# Patient Record
Sex: MALE | Race: WHITE | NOT HISPANIC OR LATINO | Employment: FULL TIME | URBAN - METROPOLITAN AREA
[De-identification: names, ages, dates, MRNs, and addresses within clinical notes are randomized per-mention and may not be internally consistent; named-entity substitution may affect disease eponyms.]

---

## 2020-10-11 ENCOUNTER — HOSPITAL ENCOUNTER (OUTPATIENT)
Facility: CLINIC | Age: 20
Discharge: HOME | End: 2020-10-12
Attending: EMERGENCY MEDICINE | Admitting: STUDENT IN AN ORGANIZED HEALTH CARE EDUCATION/TRAINING PROGRAM
Payer: COMMERCIAL

## 2020-10-11 ENCOUNTER — ANESTHESIA EVENT (OUTPATIENT)
Dept: SURGERY | Facility: CLINIC | Age: 20
End: 2020-10-11
Payer: COMMERCIAL

## 2020-10-11 ENCOUNTER — NURSE TRIAGE (OUTPATIENT)
Dept: NURSING | Facility: CLINIC | Age: 20
End: 2020-10-11

## 2020-10-11 ENCOUNTER — APPOINTMENT (OUTPATIENT)
Dept: ULTRASOUND IMAGING | Facility: CLINIC | Age: 20
End: 2020-10-11
Attending: EMERGENCY MEDICINE
Payer: COMMERCIAL

## 2020-10-11 ENCOUNTER — ANESTHESIA (OUTPATIENT)
Dept: SURGERY | Facility: CLINIC | Age: 20
End: 2020-10-11
Payer: COMMERCIAL

## 2020-10-11 ENCOUNTER — OFFICE VISIT (OUTPATIENT)
Dept: URGENT CARE | Facility: URGENT CARE | Age: 20
End: 2020-10-11
Payer: COMMERCIAL

## 2020-10-11 VITALS
HEIGHT: 65 IN | TEMPERATURE: 97.7 F | WEIGHT: 140 LBS | DIASTOLIC BLOOD PRESSURE: 68 MMHG | HEART RATE: 77 BPM | BODY MASS INDEX: 23.32 KG/M2 | SYSTOLIC BLOOD PRESSURE: 114 MMHG

## 2020-10-11 DIAGNOSIS — N44.00 TESTICULAR TORSION: ICD-10-CM

## 2020-10-11 DIAGNOSIS — N50.811 RIGHT TESTICULAR PAIN: ICD-10-CM

## 2020-10-11 DIAGNOSIS — R10.31 RLQ ABDOMINAL PAIN: Primary | ICD-10-CM

## 2020-10-11 LAB
ALBUMIN UR-MCNC: NEGATIVE MG/DL
ANION GAP SERPL CALCULATED.3IONS-SCNC: 8 MMOL/L (ref 3–14)
APPEARANCE UR: CLEAR
BACTERIA #/AREA URNS HPF: ABNORMAL /HPF
BASOPHILS # BLD AUTO: 0 10E9/L (ref 0–0.2)
BASOPHILS NFR BLD AUTO: 0.2 %
BILIRUB UR QL STRIP: ABNORMAL
BUN SERPL-MCNC: 14 MG/DL (ref 7–30)
CALCIUM SERPL-MCNC: 9.7 MG/DL (ref 8.5–10.1)
CHLORIDE SERPL-SCNC: 99 MMOL/L (ref 94–109)
CO2 SERPL-SCNC: 26 MMOL/L (ref 20–32)
COLOR UR AUTO: YELLOW
CREAT SERPL-MCNC: 0.87 MG/DL (ref 0.66–1.25)
DIFFERENTIAL METHOD BLD: ABNORMAL
EOSINOPHIL # BLD AUTO: 0 10E9/L (ref 0–0.7)
EOSINOPHIL NFR BLD AUTO: 0 %
ERYTHROCYTE [DISTWIDTH] IN BLOOD BY AUTOMATED COUNT: 12.9 % (ref 10–15)
GFR SERPL CREATININE-BSD FRML MDRD: >90 ML/MIN/{1.73_M2}
GLUCOSE SERPL-MCNC: 105 MG/DL (ref 70–99)
GLUCOSE UR STRIP-MCNC: NEGATIVE MG/DL
HCT VFR BLD AUTO: 39.8 % (ref 40–53)
HGB BLD-MCNC: 13 G/DL (ref 13.3–17.7)
HGB UR QL STRIP: NEGATIVE
HYALINE CASTS #/AREA URNS LPF: ABNORMAL /LPF
IMM GRANULOCYTES # BLD: 0.1 10E9/L (ref 0–0.4)
IMM GRANULOCYTES NFR BLD: 0.4 %
KETONES UR STRIP-MCNC: 40 MG/DL
LEUKOCYTE ESTERASE UR QL STRIP: NEGATIVE
LYMPHOCYTES # BLD AUTO: 1 10E9/L (ref 0.8–5.3)
LYMPHOCYTES NFR BLD AUTO: 5.9 %
MCH RBC QN AUTO: 29.2 PG (ref 26.5–33)
MCHC RBC AUTO-ENTMCNC: 32.7 G/DL (ref 31.5–36.5)
MCV RBC AUTO: 89 FL (ref 78–100)
MONOCYTES # BLD AUTO: 0.6 10E9/L (ref 0–1.3)
MONOCYTES NFR BLD AUTO: 3.5 %
MUCOUS THREADS #/AREA URNS LPF: PRESENT /LPF
NEUTROPHILS # BLD AUTO: 15 10E9/L (ref 1.6–8.3)
NEUTROPHILS NFR BLD AUTO: 90 %
NITRATE UR QL: NEGATIVE
NRBC # BLD AUTO: 0 10*3/UL
NRBC BLD AUTO-RTO: 0 /100
PH UR STRIP: 5.5 PH (ref 5–7)
PLATELET # BLD AUTO: 209 10E9/L (ref 150–450)
POTASSIUM SERPL-SCNC: 4.3 MMOL/L (ref 3.4–5.3)
RBC # BLD AUTO: 4.45 10E12/L (ref 4.4–5.9)
RBC #/AREA URNS AUTO: ABNORMAL /HPF
SODIUM SERPL-SCNC: 133 MMOL/L (ref 133–144)
SOURCE: ABNORMAL
SP GR UR STRIP: >1.03 (ref 1–1.03)
UROBILINOGEN UR STRIP-ACNC: 0.2 EU/DL (ref 0.2–1)
WBC # BLD AUTO: 16.7 10E9/L (ref 4–11)
WBC #/AREA URNS AUTO: ABNORMAL /HPF

## 2020-10-11 PROCEDURE — 250N000011 HC RX IP 250 OP 636: Performed by: EMERGENCY MEDICINE

## 2020-10-11 PROCEDURE — 370N000001 HC ANESTHESIA TECHNICAL FEE, 1ST 30 MIN: Performed by: STUDENT IN AN ORGANIZED HEALTH CARE EDUCATION/TRAINING PROGRAM

## 2020-10-11 PROCEDURE — 370N000002 HC ANESTHESIA TECHNICAL FEE, EACH ADDTL 15 MIN: Performed by: STUDENT IN AN ORGANIZED HEALTH CARE EDUCATION/TRAINING PROGRAM

## 2020-10-11 PROCEDURE — 761N000007 HC RECOVERY PHASE 2 EACH 15 MINS: Performed by: STUDENT IN AN ORGANIZED HEALTH CARE EDUCATION/TRAINING PROGRAM

## 2020-10-11 PROCEDURE — 81001 URINALYSIS AUTO W/SCOPE: CPT | Performed by: PHYSICIAN ASSISTANT

## 2020-10-11 PROCEDURE — 87491 CHLMYD TRACH DNA AMP PROBE: CPT | Performed by: PHYSICIAN ASSISTANT

## 2020-10-11 PROCEDURE — 360N000015 HC SURGERY LEVEL 2 EA 15 ADDTL MIN: Performed by: STUDENT IN AN ORGANIZED HEALTH CARE EDUCATION/TRAINING PROGRAM

## 2020-10-11 PROCEDURE — 54600 REDUCE TESTIS TORSION: CPT | Mod: RT | Performed by: STUDENT IN AN ORGANIZED HEALTH CARE EDUCATION/TRAINING PROGRAM

## 2020-10-11 PROCEDURE — U0003 INFECTIOUS AGENT DETECTION BY NUCLEIC ACID (DNA OR RNA); SEVERE ACUTE RESPIRATORY SYNDROME CORONAVIRUS 2 (SARS-COV-2) (CORONAVIRUS DISEASE [COVID-19]), AMPLIFIED PROBE TECHNIQUE, MAKING USE OF HIGH THROUGHPUT TECHNOLOGIES AS DESCRIBED BY CMS-2020-01-R: HCPCS | Performed by: EMERGENCY MEDICINE

## 2020-10-11 PROCEDURE — 250N000011 HC RX IP 250 OP 636: Performed by: ANESTHESIOLOGY

## 2020-10-11 PROCEDURE — 250N000011 HC RX IP 250 OP 636: Performed by: STUDENT IN AN ORGANIZED HEALTH CARE EDUCATION/TRAINING PROGRAM

## 2020-10-11 PROCEDURE — 85025 COMPLETE CBC W/AUTO DIFF WBC: CPT | Performed by: EMERGENCY MEDICINE

## 2020-10-11 PROCEDURE — 258N000003 HC RX IP 258 OP 636: Performed by: NURSE ANESTHETIST, CERTIFIED REGISTERED

## 2020-10-11 PROCEDURE — 250N000013 HC RX MED GY IP 250 OP 250 PS 637: Performed by: EMERGENCY MEDICINE

## 2020-10-11 PROCEDURE — 272N000001 HC OR GENERAL SUPPLY STERILE: Performed by: STUDENT IN AN ORGANIZED HEALTH CARE EDUCATION/TRAINING PROGRAM

## 2020-10-11 PROCEDURE — 250N000011 HC RX IP 250 OP 636: Performed by: NURSE ANESTHETIST, CERTIFIED REGISTERED

## 2020-10-11 PROCEDURE — 761N000001 HC RECOVERY PHASE 1 LEVEL 1 FIRST HR: Performed by: STUDENT IN AN ORGANIZED HEALTH CARE EDUCATION/TRAINING PROGRAM

## 2020-10-11 PROCEDURE — 87591 N.GONORRHOEAE DNA AMP PROB: CPT | Performed by: PHYSICIAN ASSISTANT

## 2020-10-11 PROCEDURE — 99203 OFFICE O/P NEW LOW 30 MIN: CPT | Performed by: PHYSICIAN ASSISTANT

## 2020-10-11 PROCEDURE — 36415 COLL VENOUS BLD VENIPUNCTURE: CPT | Performed by: PHYSICIAN ASSISTANT

## 2020-10-11 PROCEDURE — 54640 ORCHIOPEXY INGUN/SCROT APPR: CPT | Mod: 50 | Performed by: STUDENT IN AN ORGANIZED HEALTH CARE EDUCATION/TRAINING PROGRAM

## 2020-10-11 PROCEDURE — 761N000002 HC RECOVERY PHASE 1 LEVEL 1 EA ADDTL HR: Performed by: STUDENT IN AN ORGANIZED HEALTH CARE EDUCATION/TRAINING PROGRAM

## 2020-10-11 PROCEDURE — 250N000009 HC RX 250: Performed by: NURSE ANESTHETIST, CERTIFIED REGISTERED

## 2020-10-11 PROCEDURE — 99285 EMERGENCY DEPT VISIT HI MDM: CPT | Mod: 25

## 2020-10-11 PROCEDURE — 80048 BASIC METABOLIC PNL TOTAL CA: CPT | Performed by: EMERGENCY MEDICINE

## 2020-10-11 PROCEDURE — 76870 US EXAM SCROTUM: CPT

## 2020-10-11 PROCEDURE — 360N000014 HC SURGERY LEVEL 2 1ST 30 MIN: Performed by: STUDENT IN AN ORGANIZED HEALTH CARE EDUCATION/TRAINING PROGRAM

## 2020-10-11 PROCEDURE — C9803 HOPD COVID-19 SPEC COLLECT: HCPCS

## 2020-10-11 PROCEDURE — 96374 THER/PROPH/DIAG INJ IV PUSH: CPT

## 2020-10-11 PROCEDURE — 99203 OFFICE O/P NEW LOW 30 MIN: CPT | Mod: 57 | Performed by: STUDENT IN AN ORGANIZED HEALTH CARE EDUCATION/TRAINING PROGRAM

## 2020-10-11 PROCEDURE — 999N000135 HC STATISTIC PRE PROC ASSESS I: Performed by: STUDENT IN AN ORGANIZED HEALTH CARE EDUCATION/TRAINING PROGRAM

## 2020-10-11 RX ORDER — PROPOFOL 10 MG/ML
INJECTION, EMULSION INTRAVENOUS
Status: COMPLETED | OUTPATIENT
Start: 2020-10-11 | End: 2020-10-11

## 2020-10-11 RX ORDER — HYDROMORPHONE HYDROCHLORIDE 1 MG/ML
.3-.5 INJECTION, SOLUTION INTRAMUSCULAR; INTRAVENOUS; SUBCUTANEOUS EVERY 10 MIN PRN
Status: DISCONTINUED | OUTPATIENT
Start: 2020-10-11 | End: 2020-10-12 | Stop reason: HOSPADM

## 2020-10-11 RX ORDER — METOPROLOL TARTRATE 1 MG/ML
1-2 INJECTION, SOLUTION INTRAVENOUS EVERY 5 MIN PRN
Status: DISCONTINUED | OUTPATIENT
Start: 2020-10-11 | End: 2020-10-11 | Stop reason: HOSPADM

## 2020-10-11 RX ORDER — ONDANSETRON 2 MG/ML
4 INJECTION INTRAMUSCULAR; INTRAVENOUS EVERY 30 MIN PRN
Status: COMPLETED | OUTPATIENT
Start: 2020-10-11 | End: 2020-10-12

## 2020-10-11 RX ORDER — SODIUM CHLORIDE, SODIUM LACTATE, POTASSIUM CHLORIDE, CALCIUM CHLORIDE 600; 310; 30; 20 MG/100ML; MG/100ML; MG/100ML; MG/100ML
INJECTION, SOLUTION INTRAVENOUS CONTINUOUS PRN
Status: DISCONTINUED | OUTPATIENT
Start: 2020-10-11 | End: 2020-10-12

## 2020-10-11 RX ORDER — KETOROLAC TROMETHAMINE 30 MG/ML
30 INJECTION, SOLUTION INTRAMUSCULAR; INTRAVENOUS EVERY 6 HOURS PRN
Status: DISCONTINUED | OUTPATIENT
Start: 2020-10-11 | End: 2020-10-12 | Stop reason: HOSPADM

## 2020-10-11 RX ORDER — FENTANYL CITRATE 50 UG/ML
25-50 INJECTION, SOLUTION INTRAMUSCULAR; INTRAVENOUS EVERY 5 MIN PRN
Status: DISCONTINUED | OUTPATIENT
Start: 2020-10-11 | End: 2020-10-11 | Stop reason: HOSPADM

## 2020-10-11 RX ORDER — ALBUTEROL SULFATE 0.83 MG/ML
2.5 SOLUTION RESPIRATORY (INHALATION) EVERY 4 HOURS PRN
Status: DISCONTINUED | OUTPATIENT
Start: 2020-10-11 | End: 2020-10-11 | Stop reason: HOSPADM

## 2020-10-11 RX ORDER — CEFAZOLIN SODIUM 1 G/50ML
1 INJECTION, SOLUTION INTRAVENOUS SEE ADMIN INSTRUCTIONS
Status: DISCONTINUED | OUTPATIENT
Start: 2020-10-11 | End: 2020-10-12 | Stop reason: HOSPADM

## 2020-10-11 RX ORDER — FENTANYL CITRATE 50 UG/ML
INJECTION, SOLUTION INTRAMUSCULAR; INTRAVENOUS PRN
Status: DISCONTINUED | OUTPATIENT
Start: 2020-10-11 | End: 2020-10-12

## 2020-10-11 RX ORDER — MEPERIDINE HYDROCHLORIDE 25 MG/ML
12.5 INJECTION INTRAMUSCULAR; INTRAVENOUS; SUBCUTANEOUS
Status: DISCONTINUED | OUTPATIENT
Start: 2020-10-11 | End: 2020-10-12 | Stop reason: HOSPADM

## 2020-10-11 RX ORDER — DEXAMETHASONE SODIUM PHOSPHATE 4 MG/ML
INJECTION, SOLUTION INTRA-ARTICULAR; INTRALESIONAL; INTRAMUSCULAR; INTRAVENOUS; SOFT TISSUE PRN
Status: DISCONTINUED | OUTPATIENT
Start: 2020-10-11 | End: 2020-10-12

## 2020-10-11 RX ORDER — CEFAZOLIN SODIUM 2 G/100ML
2 INJECTION, SOLUTION INTRAVENOUS
Status: COMPLETED | OUTPATIENT
Start: 2020-10-11 | End: 2020-10-11

## 2020-10-11 RX ORDER — HYDRALAZINE HYDROCHLORIDE 20 MG/ML
2.5-5 INJECTION INTRAMUSCULAR; INTRAVENOUS EVERY 10 MIN PRN
Status: DISCONTINUED | OUTPATIENT
Start: 2020-10-11 | End: 2020-10-11 | Stop reason: HOSPADM

## 2020-10-11 RX ORDER — LIDOCAINE HYDROCHLORIDE 10 MG/ML
INJECTION, SOLUTION INFILTRATION; PERINEURAL PRN
Status: DISCONTINUED | OUTPATIENT
Start: 2020-10-11 | End: 2020-10-12

## 2020-10-11 RX ORDER — NALOXONE HYDROCHLORIDE 0.4 MG/ML
.1-.4 INJECTION, SOLUTION INTRAMUSCULAR; INTRAVENOUS; SUBCUTANEOUS
Status: DISCONTINUED | OUTPATIENT
Start: 2020-10-11 | End: 2020-10-12 | Stop reason: HOSPADM

## 2020-10-11 RX ORDER — ONDANSETRON 4 MG/1
4 TABLET, ORALLY DISINTEGRATING ORAL EVERY 30 MIN PRN
Status: COMPLETED | OUTPATIENT
Start: 2020-10-11 | End: 2020-10-12

## 2020-10-11 RX ORDER — MORPHINE SULFATE 4 MG/ML
4 INJECTION, SOLUTION INTRAMUSCULAR; INTRAVENOUS
Status: DISCONTINUED | OUTPATIENT
Start: 2020-10-11 | End: 2020-10-12 | Stop reason: HOSPADM

## 2020-10-11 RX ORDER — SODIUM CHLORIDE, SODIUM LACTATE, POTASSIUM CHLORIDE, CALCIUM CHLORIDE 600; 310; 30; 20 MG/100ML; MG/100ML; MG/100ML; MG/100ML
INJECTION, SOLUTION INTRAVENOUS CONTINUOUS
Status: DISCONTINUED | OUTPATIENT
Start: 2020-10-11 | End: 2020-10-12 | Stop reason: HOSPADM

## 2020-10-11 RX ORDER — ONDANSETRON 2 MG/ML
INJECTION INTRAMUSCULAR; INTRAVENOUS
Status: DISCONTINUED
Start: 2020-10-11 | End: 2020-10-11 | Stop reason: WASHOUT

## 2020-10-11 RX ORDER — OXYCODONE AND ACETAMINOPHEN 5; 325 MG/1; MG/1
1 TABLET ORAL ONCE
Status: DISCONTINUED | OUTPATIENT
Start: 2020-10-11 | End: 2020-10-11 | Stop reason: CLARIF

## 2020-10-11 RX ORDER — PROPOFOL 10 MG/ML
INJECTION, EMULSION INTRAVENOUS PRN
Status: DISCONTINUED | OUTPATIENT
Start: 2020-10-11 | End: 2020-10-12

## 2020-10-11 RX ORDER — ACETAMINOPHEN 500 MG
1000 TABLET ORAL EVERY 4 HOURS PRN
Status: DISCONTINUED | OUTPATIENT
Start: 2020-10-11 | End: 2020-10-12 | Stop reason: HOSPADM

## 2020-10-11 RX ADMIN — MIDAZOLAM 2 MG: 1 INJECTION INTRAMUSCULAR; INTRAVENOUS at 23:38

## 2020-10-11 RX ADMIN — PROPOFOL 200 MG: 10 INJECTION, EMULSION INTRAVENOUS at 23:45

## 2020-10-11 RX ADMIN — DEXAMETHASONE SODIUM PHOSPHATE 4 MG: 4 INJECTION, SOLUTION INTRA-ARTICULAR; INTRALESIONAL; INTRAMUSCULAR; INTRAVENOUS; SOFT TISSUE at 23:45

## 2020-10-11 RX ADMIN — CEFAZOLIN SODIUM 2 G: 2 INJECTION, SOLUTION INTRAVENOUS at 23:38

## 2020-10-11 RX ADMIN — MORPHINE SULFATE 4 MG: 4 INJECTION INTRAVENOUS at 21:35

## 2020-10-11 RX ADMIN — SODIUM CHLORIDE, POTASSIUM CHLORIDE, SODIUM LACTATE AND CALCIUM CHLORIDE: 600; 310; 30; 20 INJECTION, SOLUTION INTRAVENOUS at 23:38

## 2020-10-11 RX ADMIN — Medication 100 MG: at 23:46

## 2020-10-11 RX ADMIN — FENTANYL CITRATE 100 MCG: 50 INJECTION, SOLUTION INTRAMUSCULAR; INTRAVENOUS at 23:45

## 2020-10-11 RX ADMIN — ACETAMINOPHEN 1000 MG: 500 TABLET, FILM COATED ORAL at 21:07

## 2020-10-11 RX ADMIN — Medication 100 MG: at 23:45

## 2020-10-11 RX ADMIN — PROPOFOL 200 MG: 10 INJECTION, EMULSION INTRAVENOUS at 23:46

## 2020-10-11 RX ADMIN — LIDOCAINE HYDROCHLORIDE 50 MG: 10 INJECTION, SOLUTION INFILTRATION; PERINEURAL at 23:45

## 2020-10-11 ASSESSMENT — ENCOUNTER SYMPTOMS
DYSURIA: 0
ABDOMINAL PAIN: 1
HEMATURIA: 0

## 2020-10-11 ASSESSMENT — MIFFLIN-ST. JEOR
SCORE: 1561.92
SCORE: 1561.92

## 2020-10-11 NOTE — TELEPHONE ENCOUNTER
"Patient reports \"Bad pain in lower right abdomen and right testicle\"    Has had the right testicle pain for 3 days- rates pain in testicle 7/10 and constant. Has swelling on right testicle, and it is \"a little red\" Denies fevers.    Patient states his abdominal pain is worse then the testicular pain.     Disposition: Advised to be seen in the emergency department.     Patient verbalized understanding and agrees and he states he will go into the ED.     Aiyana Reddy RN/BECKY [facility] Nurse Advisors      COVID 19 Nurse Triage Plan/Patient Instructions    Please be aware that novel coronavirus (COVID-19) may be circulating in the community. If you develop symptoms such as fever, cough, or SOB or if you have concerns about the presence of another infection including coronavirus (COVID-19), please contact your health care provider or visit [email or web address].     Disposition/Instructions    ED Visit recommended. Follow protocol based instructions.     Bring Your Own Device:  Please also bring your smart device(s) (smart phones, tablets, laptops) and their charging cables for your personal use and to communicate with your care team during your visit.    Thank you for taking steps to prevent the spread of this virus.  o Limit your contact with others.  o Wear a simple mask to cover your cough.  o Wash your hands well and often.    Resources    [facility]: About COVID-19: [email or web address]    CDC: What to Do If You're Sick: www.cdc.gov/coronavirus/2019-ncov/about/steps-when-sick.html    CDC: Ending Home Isolation: www.cdc.gov/coronavirus/2019-ncov/hcp/disposition-in-home-patients.html     CDC: Caring for Someone: www.cdc.gov/coronavirus/2019-ncov/if-you-are-sick/care-for-someone.html     [facility]: Interim Guidance for Hospital Discharge to Home: www.health.[facility].[place]/diseases/coronavirus/hcp/hospdischarge.pdf    [facility] clinical trials (COVID-19 research studies): " clinicalaffairs.[facility]-clinical-trials     Below are the COVID-19 hotlines at the [place] Department of Health ([facility]). Interpreters are available.   o For health questions: Call 826-339-3037 or 1-552.893.5595 (7 a.m. to 7 p.m.)  o For questions about schools and childcare: Call 573-669-2773 or 1-457.977.7447 (7 a.m. to 7 p.m.)     Reason for Disposition    SEVERE pain (e.g., excruciating)    Swollen scrotum    Protocols used: SCROTAL PAIN-A-AH

## 2020-10-12 VITALS
SYSTOLIC BLOOD PRESSURE: 118 MMHG | RESPIRATION RATE: 16 BRPM | WEIGHT: 140 LBS | HEIGHT: 65 IN | TEMPERATURE: 97.4 F | OXYGEN SATURATION: 99 % | HEART RATE: 84 BPM | DIASTOLIC BLOOD PRESSURE: 66 MMHG | BODY MASS INDEX: 23.32 KG/M2

## 2020-10-12 LAB
LABORATORY COMMENT REPORT: NORMAL
SARS-COV-2 RNA SPEC QL NAA+PROBE: NEGATIVE
SARS-COV-2 RNA SPEC QL NAA+PROBE: NORMAL
SPECIMEN SOURCE: NORMAL
SPECIMEN SOURCE: NORMAL

## 2020-10-12 PROCEDURE — 250N000013 HC RX MED GY IP 250 OP 250 PS 637: Performed by: STUDENT IN AN ORGANIZED HEALTH CARE EDUCATION/TRAINING PROGRAM

## 2020-10-12 PROCEDURE — 250N000011 HC RX IP 250 OP 636: Performed by: ANESTHESIOLOGY

## 2020-10-12 PROCEDURE — 250N000009 HC RX 250: Performed by: STUDENT IN AN ORGANIZED HEALTH CARE EDUCATION/TRAINING PROGRAM

## 2020-10-12 PROCEDURE — 258N000003 HC RX IP 258 OP 636: Performed by: ANESTHESIOLOGY

## 2020-10-12 PROCEDURE — 250N000011 HC RX IP 250 OP 636: Performed by: NURSE ANESTHETIST, CERTIFIED REGISTERED

## 2020-10-12 RX ORDER — ACETAMINOPHEN 500 MG
1000 TABLET ORAL EVERY 6 HOURS PRN
Qty: 100 TABLET | Refills: 0 | Status: SHIPPED | OUTPATIENT
Start: 2020-10-12 | End: 2021-05-22

## 2020-10-12 RX ORDER — OXYCODONE HYDROCHLORIDE 5 MG/1
5 TABLET ORAL EVERY 4 HOURS PRN
Status: DISCONTINUED | OUTPATIENT
Start: 2020-10-12 | End: 2020-10-12 | Stop reason: HOSPADM

## 2020-10-12 RX ORDER — ACETAMINOPHEN 500 MG
1000 TABLET ORAL ONCE
Status: COMPLETED | OUTPATIENT
Start: 2020-10-12 | End: 2020-10-12

## 2020-10-12 RX ORDER — FENTANYL CITRATE 50 UG/ML
25-50 INJECTION, SOLUTION INTRAMUSCULAR; INTRAVENOUS
Status: DISCONTINUED | OUTPATIENT
Start: 2020-10-12 | End: 2020-10-12 | Stop reason: HOSPADM

## 2020-10-12 RX ORDER — OXYCODONE HYDROCHLORIDE 5 MG/1
5 TABLET ORAL EVERY 6 HOURS PRN
Qty: 15 TABLET | Refills: 0 | Status: SHIPPED | OUTPATIENT
Start: 2020-10-12 | End: 2020-10-17

## 2020-10-12 RX ORDER — BACITRACIN ZINC 500 [USP'U]/G
OINTMENT TOPICAL PRN
Status: DISCONTINUED | OUTPATIENT
Start: 2020-10-12 | End: 2020-10-12 | Stop reason: HOSPADM

## 2020-10-12 RX ORDER — BACITRACIN ZINC 500 [USP'U]/G
OINTMENT TOPICAL 2 TIMES DAILY
Qty: 30 G | Refills: 1 | Status: SHIPPED | OUTPATIENT
Start: 2020-10-12 | End: 2021-05-22

## 2020-10-12 RX ORDER — OXYCODONE HYDROCHLORIDE 5 MG/1
5 TABLET ORAL ONCE
Status: CANCELLED | OUTPATIENT
Start: 2020-10-12 | End: 2020-10-12

## 2020-10-12 RX ORDER — DOCUSATE SODIUM 100 MG/1
100 CAPSULE, LIQUID FILLED ORAL 2 TIMES DAILY
Qty: 30 CAPSULE | Refills: 0 | Status: SHIPPED | OUTPATIENT
Start: 2020-10-12 | End: 2021-05-22

## 2020-10-12 RX ADMIN — HYDROMORPHONE HYDROCHLORIDE 0.5 MG: 1 INJECTION, SOLUTION INTRAMUSCULAR; INTRAVENOUS; SUBCUTANEOUS at 01:42

## 2020-10-12 RX ADMIN — SODIUM CHLORIDE, POTASSIUM CHLORIDE, SODIUM LACTATE AND CALCIUM CHLORIDE: 600; 310; 30; 20 INJECTION, SOLUTION INTRAVENOUS at 01:48

## 2020-10-12 RX ADMIN — PROPOFOL 30 MG: 10 INJECTION, EMULSION INTRAVENOUS at 00:56

## 2020-10-12 RX ADMIN — ACETAMINOPHEN 1000 MG: 500 TABLET, FILM COATED ORAL at 03:25

## 2020-10-12 RX ADMIN — HYDROMORPHONE HYDROCHLORIDE 0.5 MG: 1 INJECTION, SOLUTION INTRAMUSCULAR; INTRAVENOUS; SUBCUTANEOUS at 00:52

## 2020-10-12 RX ADMIN — HYDROMORPHONE HYDROCHLORIDE 0.5 MG: 1 INJECTION, SOLUTION INTRAMUSCULAR; INTRAVENOUS; SUBCUTANEOUS at 00:57

## 2020-10-12 RX ADMIN — ONDANSETRON 4 MG: 2 INJECTION INTRAMUSCULAR; INTRAVENOUS at 02:31

## 2020-10-12 RX ADMIN — ONDANSETRON 4 MG: 2 INJECTION INTRAMUSCULAR; INTRAVENOUS at 00:26

## 2020-10-12 RX ADMIN — PROCHLORPERAZINE EDISYLATE 10 MG: 5 INJECTION INTRAMUSCULAR; INTRAVENOUS at 03:40

## 2020-10-12 RX ADMIN — HYDROMORPHONE HYDROCHLORIDE 0.5 MG: 1 INJECTION, SOLUTION INTRAMUSCULAR; INTRAVENOUS; SUBCUTANEOUS at 01:59

## 2020-10-12 NOTE — ED PROVIDER NOTES
"History     Chief Complaint:  Testicular Pain       HPI  Rachid Umanzor is a 20 year old year old male who presents for evaluation of testicular pain. Two days ago the patient began experiencing mild right testicular pain and then today the pain worsened about 4 hours prior to arrival and was accompanied by right lower quadrant abdominal pain. He rates his current pain an \"8 or 9\" out of 10. He did take ibuprofen for pain yesterday but has not taken anything today. He denies any trauma or injury to the area, history of kidney problems, or concern for STD. He also denies hematuria, dysuria, or discharge or drainage.    Urine from Urgent Care earlier on 10/11/20  Result Value      Color Urine Yellow      Appearance Urine Clear      Glucose Urine Negative      Bilirubin Urine Small (A)      Ketones Urine 40 (A)      Specific Gravity Urine >1.030      pH Urine 5.5      Protein Albumin Urine Negative      Urobilinogen Urine 0.2      Nitrite Urine Negative      Blood Urine Negative      Leukocyte Esterase Urine Negative      Source Midstream Urine      WBC Urine 0 - 5      RBC Urine O - 2      Hyaline Casts 2-5 (A)      Bacteria Urine Few (A)      Mucous Urine Present (A)    Chlamydia trachomatic PCR and Neisseria Gonorrhea PCR: pending    Allergies:  Azithromycin       Medications:   Medications reviewed. No pertinent medications.      Medical History:   Arachnoid cyst      Surgical History:   Surgical history reviewed. No pertinent surgical history.      Family History:   Family history reviewed. No pertinent family history.      Social History:  Smoking Status: Negative   Smokeless Tobacco: Negative   Alcohol Use: Negative   Drug Use: Negative   Primary Physician: Saadia Mckee         Review of Systems   Gastrointestinal: Positive for abdominal pain.   Genitourinary: Positive for scrotal swelling and testicular pain. Negative for dysuria and hematuria.   All other systems reviewed and are negative.        Physical Exam "     Patient Vitals for the past 24 hrs:   BP Temp Temp src Pulse Resp SpO2   10/11/20 2030 114/73 98.7  F (37.1  C) Oral 86 18 99 %          Physical Exam  Constitutional:       Appearance: He is well-developed.   HENT:      Right Ear: External ear normal.      Left Ear: External ear normal.      Mouth/Throat:      Mouth: Mucous membranes are moist.      Pharynx: Oropharynx is clear. No oropharyngeal exudate.   Eyes:      General: No scleral icterus.     Conjunctiva/sclera: Conjunctivae normal.      Pupils: Pupils are equal, round, and reactive to light.   Cardiovascular:      Rate and Rhythm: Normal rate and regular rhythm.      Heart sounds: Normal heart sounds. No murmur. No friction rub. No gallop.    Pulmonary:      Effort: Pulmonary effort is normal. No respiratory distress.      Breath sounds: Normal breath sounds. No wheezing or rales.   Abdominal:      General: Bowel sounds are normal. There is no distension.      Palpations: Abdomen is soft. There is no mass.      Tenderness: There is abdominal tenderness. There is no guarding or rebound.      Comments: Mild RLQ TTP   Genitourinary:     Penis: Normal.       Testes:         Right: Tenderness and swelling present.      Epididymis:      Right: Normal.      Left: Normal.   Musculoskeletal: Normal range of motion.   Skin:     General: Skin is warm and dry.      Capillary Refill: Capillary refill takes less than 2 seconds.      Findings: No rash.   Neurological:      Mental Status: He is alert.           Emergency Department Course     Imaging:  Radiology results were communicated with the patient who voiced understanding of the findings.    US Testicular & Scrotum w Doppler Ltd  1.  No blood flow in the right testicle consistent with testicular torsion.    Findings telephoned to Dr. Rayo on 10/11/2020 at 10:10 PM.    Reading per radiology     Laboratory:  Laboratory findings were communicated with the patient who voiced understanding of the  findings.    COVID-19 Virus (Coronavirus), PCR NP Swab: pending      CBC: WBC 16.7 (H), HGB 13.0 (L),   BMP: Glucose 105 (H) (Creatinine 0.87) o/w WNL       Interventions:   2107 Tylenol 1000 mg PO  2135 Morphine 4 mg IV      Emergency Department Course:    2035 Nursing notes and vitals reviewed.    2040 I performed an exam of the patient as documented above.     2112 IV was inserted and blood was drawn for laboratory testing, results above.    2149 The patient was sent for US while in the emergency department, results above.     2225 I consulted with Dr. Shaw of the urology services. They are in agreement to accept the patient for admission. Findings and plan explained to the Patient who consents to admission. Discussed the patient with Dr. Shaw, who will admit the patient to an OR bed.    2229 A nares sample was obtained for laboratory testing as documented above.      Impression & Plan     Medical Decision Making:    This patient presents with almost two days of right testicular pain and swelling. Patient on exam does have obvious right testicular swelling. It is tender towards the interior portion and there is some mild right lower quadrant tenderness. He appears comfortable although his white count is elevated. He did have a urine sample sent at urgent care and that did not show anything concerning. Ultrasound confirmed testicular torsion without any blood flow. I contacted urology Dr. Shaw immediately and he agreed to surgically evaluate the patient. Patient's last PO status was 11 am with meal. He is comfortable with the treatment plan. Mother is in agreement. He was made NPO.     Covid-19  Rachid Umanzor was evaluated during a global COVID-19 pandemic, which necessitated consideration that the patient might be at risk for infection with the SARS-CoV-2 virus that causes COVID-19.   Applicable protocols for evaluation were followed during the patient's care.   COVID-19 was considered as part of the  patient's evaluation. The plan for testing is:  a test was obtained during this visit.    Diagnosis:     ICD-10-CM    1. Testicular torsion  N44.00         Disposition:  Admitted to Dr. Shaw in the OR.      Scribe Disclosure:  I, Katelyn Fagan, am serving as a scribe at 8:37 PM on 10/11/2020 to document services personally performed by Srinivas Rayo MD based on my observations and the provider's statements to me.      Srinivas Rayo MD  10/11/20 [id number]

## 2020-10-12 NOTE — ED TRIAGE NOTES
Pt presents to ED due to right testicular pain/swelling.  Pain radiating into flank.  Was sent from  for ultrasound.  ABCs intact

## 2020-10-12 NOTE — ANESTHESIA POSTPROCEDURE EVALUATION
Patient: Rachid Umanzor    Procedure(s):  SCROTAL EXPLORATION, BILATERAL ORCHIOPEXY    Diagnosis:Torsion of right testicle [N44.00]  Diagnosis Additional Information: No value filed.    Anesthesia Type:  General    Note:  Anesthesia Post Evaluation    Patient location during evaluation: PACU  Patient participation: Able to fully participate in evaluation  Level of consciousness: awake  Pain management: adequate  Airway patency: patent  Cardiovascular status: acceptable  Respiratory status: acceptable  Hydration status: acceptable  PONV: controlled     Anesthetic complications: None          Last vitals:  Vitals:    10/12/20 0325 10/12/20 0420 10/12/20 0450   BP: 118/74 102/55 118/66   Pulse:  105 84   Resp: 16 10 16   Temp:  97.2  F (36.2  C) 97.4  F (36.3  C)   SpO2: 97% 99%          Electronically Signed By: Mike Bray MD  October 12, 2020  5:30 AM

## 2020-10-12 NOTE — PROGRESS NOTES
"Patient presents with:  Urgent Care: RLQ abdominal pain that started today.   Urgent Care: groin pain that started a few days ago.       SUBJECTIVE  HPI:  Rachid Umanzor is a 20 year old male who presents with right testicular pain for 3 days with right sided abdominal pain today.     Testicle is swollen and tender.    Denies any dysuria or penile discharge.  Denies any urinary frequency or urgency    He is currently sexually active.    Denies any fevers.  Denies any vomiting, but did have some nausea with the abdominal pain.   Abdominal and testicle pain are 8/10 on a 0-10 pain scale.        SH: here with his mother today    Past Medical History:   Diagnosis Date     Arachnoid cyst     asx     Short stature, familial 2/17/2016     Current Outpatient Medications   Medication Sig Dispense Refill     NO ACTIVE MEDICATIONS .       Social History     Tobacco Use     Smoking status: Never Smoker     Smokeless tobacco: Never Used   Substance Use Topics     Alcohol use: No     Alcohol/week: 0.0 standard drinks       ROS:  CONSTITUTIONAL:NEGATIVE for fever, chills, change in weight  INTEGUMENTARY/SKIN: NEGATIVE for worrisome rashes, moles or lesions  ENT/MOUTH: NEGATIVE for ear, mouth and throat problems  RESP:NEGATIVE for significant cough or SOB  CV: NEGATIVE for chest pain, palpitations or peripheral edema  GI: NEGATIVE for nausea, abdominal pain, heartburn, or change in bowel habits  : as per HPI  MUSCULOSKELETAL: NEGATIVE for significant arthralgias or myalgia  NEURO: NEGATIVE for weakness, dizziness or paresthesias  PSYCHIATRIC: NEGATIVE for changes in mood or affect  Review of systems negative except as stated above.    OBJECTIVE:  /68   Pulse 77   Temp 97.7  F (36.5  C) (Temporal)   Ht 1.651 m (5' 5\")   Wt 63.5 kg (140 lb)   BMI 23.30 kg/m    GENERAL APPEARANCE: moderate distress secondary to pain  ABDOMEN:  soft, nontender, no HSM or masses and bowel sounds normal  GU_male: right testicle is enlarged and " tender with a palpable indurated mass.  Penis is without drainage.  Left testicle with prominent epididymis which is non tender.   SKIN: no suspicious lesions or rashes    Results for orders placed or performed in visit on 10/11/20   UA with Microscopic reflex to Culture     Status: Abnormal    Specimen: Midstream Urine   Result Value Ref Range    Color Urine Yellow     Appearance Urine Clear     Glucose Urine Negative NEG^Negative mg/dL    Bilirubin Urine Small (A) NEG^Negative    Ketones Urine 40 (A) NEG^Negative mg/dL    Specific Gravity Urine >1.030 1.003 - 1.035    pH Urine 5.5 5.0 - 7.0 pH    Protein Albumin Urine Negative NEG^Negative mg/dL    Urobilinogen Urine 0.2 0.2 - 1.0 EU/dL    Nitrite Urine Negative NEG^Negative    Blood Urine Negative NEG^Negative    Leukocyte Esterase Urine Negative NEG^Negative    Source Midstream Urine     WBC Urine 0 - 5 OTO5^0 - 5 /HPF    RBC Urine O - 2 OTO2^O - 2 /HPF    Hyaline Casts 2-5 (A) OTO2^O - 2 /LPF    Bacteria Urine Few (A) NEG^Negative /HPF    Mucous Urine Present (A) NEG^Negative /LPF     (R10.31) RLQ abdominal pain  (primary encounter diagnosis)  Comment:   Plan: UA with Microscopic reflex to Culture            (N50.811) Right testicular pain  Comment: with painful mass  Plan: NEISSERIA GONORRHOEA PCR, CHLAMYDIA TRACHOMATIS        PCR          TO ED now for further evaluation, and possible ultrasound.

## 2020-10-12 NOTE — OP NOTE
[facility]  Operative Note    Pre-operative diagnosis: Torsion of right testicle [N44.00]   Post-operative diagnosis Same as preop   Procedure: Procedure(s):  SCROTAL EXPLORATION, RIGHT TESTICULAR DETORSION, BILATERAL SCROTAL ORCHIOPEXY   Surgeon: Junior Shaw MD   Assistants(s): none   Anesthesia: General    Estimated blood loss: Less than 10 ml    Total IV fluids: (See anesthesia record)   Blood transfusion: No transfusion was given during surgery   Total urine output: (See anesthesia record)   Drains: None   Specimens: None   Implants: None   Findings: 360 degree clockwise twist of the right spermatic cord, detorsed  Right testicle initially appeared very ischemic, but become pink and perfused after detorsion  Left testicle normal; ~1 cm left epididymal head cyst.     Complications: None.   Condition: Stable       Description of procedure:  19 yo M with RIGHT testicular pain x 3 days now with radiation to the right lower quadrant, concern for right testicular torsion with no blood flow seen on testicular ultrasound. Risks and benefits were discussed and patient opts to go to the operating room for scrotal exploration. I discussed with the patient given the chronicity of the testicular pain for 3 days it is possible that the RIGHT testicle is nonviable and will need orchiectomy. If it appears viable, will plan for orchiopexy. In any case, will perform orchiopexy on the contralateral LEFT testicle to ensure that it does not torse in the future. Informed consent was obtained    Patient was brought to operating room #2.  A weight and renal appropriate dose of Ancef antibiotics was given prior to the procedure.  General anesthesia was induced, he was placed in the supine position, prepped and draped in standard sterile fashion.  A timeout was performed.    The scrotum was examined.  The right testicle felt firm and enlarged.  A 3 cm right transverse scrotal incision was marked out with a  skin marker and incised sharply.  Dissection was taken down through the dartos fascia with electrocautery.  The testicle within the tunica vaginalis was delivered through the incision.  The tunica vaginalis was opened up sharply with Metzenbaum scissors. There was a small amount of serous fluid surrounding the testicle.  The right spermatic cord was noted to have a 360 degree clockwise twist and the testicle appeared very dusky, deep purple almost black in color.  The epididymis also appeared to be similarly ischemic.  The testicle was detorsed.  It was then set aside in a warm saline soaked Ray-Eduar while attention was turned to orchiopexy of the contralateral testicle.    A 2.5 cm left transverse scrotal incision was marked out over the left testicle, and incised sharply.  Dissection was taken down through dartos fascia with electrocautery.  The testicle within the tunica vaginalis was delivered through the incision.  The tunica vaginalis was opened up sharply.  The testicle was noted to be normal.  There was a 1 cm left epididymal head cyst.  The testicle was confirmed to be in orthotopic position with no tension or torsion of the cord, with the lateral sulcus lateral, and was fixed in place to the dartos fascia within the left hemiscrotum using interrupted 4-0 PDS in 3 non-collinear points (inferior, left lateral, and right lateral).  Hemostasis was ensured using cautery.  The dartos fascia was then closed over the testicle with running 3-0 Vicryl.    The right testicle was then reexamined.  The testicle had pinked up considerably and no longer appeared ischemic.  There were some scattered areas of purpura, however it was felt that the testicle would likely be viable.  The appendix epididymis and the appendix testis were removed from the right testicle using cautery.   The testicle was confirmed to be in orthotopic position with no tension or torsion of the cord, with the lateral sulcus lateral, and was fixed in  place to the dartos fascia within the right hemiscrotum using interrupted 4-0 PDS in 3 non-collinear points (inferior, left lateral, and right lateral).  Hemostasis was ensured using cautery.  The dartos fascia was then closed over the testicle with running 3-0 Vicryl.    Skin was closed over the bilateral incisions using running 3-0 chromic horizontal mattress suture.  Patient was cleaned up, bacitracin was applied to the wounds, scrotal fluffs and mesh panties were applied for dressing.  He was awoken from anesthesia and brought to PACU in stable condition.  He will return in 1 month to see me in clinic for postoperative visit.    Junior Shaw MD   [facility]  [phone] clinic phone

## 2020-10-12 NOTE — ANESTHESIA PROCEDURE NOTES
Airway   Date/Time: 10/11/2020 11:46 PM   Patient location during procedure: OR    Staff -   Anesthesiologist:  Mike Bray MD  CRNA: Severson, Dean Dennis, APRN CRNA  Performed By: CRNA    Indications and Patient Condition  Indications for airway management: thelma-procedural and airway protection  Induction type:intravenousMask difficulty assessment: 0 - not attempted    Final Airway Details  Final airway type: endotracheal airway  Successful airway:ETT - single  Endotracheal Airway Details   ETT size (mm): 7.0  Cuffed: yes  Cuff volume (mL): 8  Successful intubation technique: video laryngoscopy  Grade View of Cords: 1  Adjucts: stylet  Measured from: lips  Secured at (cm): 24  Secured with: plastic tape and commercial tube young  Bite block used: Soft    Post intubation assessment   Placement verified by: capnometry, equal breath sounds and chest rise   Number of attempts at approach: 1  Number of other approaches attempted: 0  Secured with:plastic tape and commercial tube young  Ease of procedure: easy  Dentition: Intact and Unchanged    Medications Administered  Propofol (DIPRIVAN) injection 10 mg/mL vial, 200 mg  succinylcholine (ANECTINE) 20 mg/mL injection, 100 mg

## 2020-10-12 NOTE — ANESTHESIA PREPROCEDURE EVALUATION
Anesthesia Pre-Procedure Evaluation    Patient: Rachid Umanzor   MRN: [id number] : 2000          Preoperative Diagnosis: Torsion of right testicle [N44.00]    Procedure(s):  SCROTAL EXPLORATION, RIGHT ORCHIOPEXY, POSSIBLE RIGHT ORCHIECTOMY    Past Medical History:   Diagnosis Date     Arachnoid cyst     asx     Short stature, familial 2016     History reviewed. No pertinent surgical history.  Anesthesia Evaluation     .             ROS/MED HX    ENT/Pulmonary:  - neg pulmonary ROS    (-) sleep apnea   Neurologic:       Cardiovascular:  - neg cardiovascular ROS       METS/Exercise Tolerance:     Hematologic:         Musculoskeletal:         GI/Hepatic:         Renal/Genitourinary:         Endo:         Psychiatric:         Infectious Disease:         Malignancy:         Other:                          Physical Exam      Airway   Mallampati: II  TM distance: >3 FB  Neck ROM: full    Dental     Cardiovascular   Rhythm and rate: regular and normal      Pulmonary    breath sounds clear to auscultation            Lab Results   Component Value Date    WBC 16.7 (H) 10/11/2020    HGB 13.0 (L) 10/11/2020    HCT 39.8 (L) 10/11/2020     10/11/2020     10/11/2020    POTASSIUM 4.3 10/11/2020    CHLORIDE 99 10/11/2020    CO2 26 10/11/2020    BUN 14 10/11/2020    CR 0.87 10/11/2020     (H) 10/11/2020    THEE 9.7 10/11/2020    TSH 1.83 2010    T4 0.85 2010       Preop Vitals  BP Readings from Last 3 Encounters:   10/11/20 131/72   10/11/20 114/68   16 133/81 (95 %, Z = 1.65 /  95 %, Z = 1.62)*     *BP percentiles are based on the 2017 AAP Clinical Practice Guideline for boys    Pulse Readings from Last 3 Encounters:   10/11/20 104   10/11/20 77   16 102      Resp Readings from Last 3 Encounters:   10/11/20 18   10/10/06 18   10/05/06 20    SpO2 Readings from Last 3 Encounters:   10/11/20 99%   16 100%   16 97%      Temp Readings from Last 1 Encounters:   10/11/20 97.8  " F (36.6  C) (Temporal)    Ht Readings from Last 1 Encounters:   10/11/20 1.651 m (5' 5\")      Wt Readings from Last 1 Encounters:   10/11/20 63.5 kg (140 lb)    Estimated body mass index is 23.3 kg/m  as calculated from the following:    Height as of an earlier encounter on 10/11/20: 1.651 m (5' 5\").    Weight as of an earlier encounter on 10/11/20: 63.5 kg (140 lb).       Anesthesia Plan      History & Physical Review  History and physical reviewed and following examination; no interval change.    ASA Status:  1 emergent.    NPO Status:  > 8 hours    Plan for General with Intravenous and Propofol induction. Maintenance will be Balanced.    PONV prophylaxis:  Ondansetron (or other 5HT-3) and Dexamethasone or Solumedrol         Postoperative Care  Postoperative pain management:  IV analgesics, Oral pain medications and Multi-modal analgesia.      Consents  Anesthetic plan, risks, benefits and alternatives discussed with:  Patient..                 Mike Bray MD                    .  "

## 2020-10-12 NOTE — ANESTHESIA CARE TRANSFER NOTE
Patient: Rachid Umanzor    Procedure(s):  SCROTAL EXPLORATION, BILATERAL ORCHIOPEXY    Diagnosis: Torsion of right testicle [N44.00]  Diagnosis Additional Information: No value filed.    Anesthesia Type:   General     Note:  Airway :Face Mask  Patient transferred to:PACU  Handoff Report: Identifed the Patient, Identified the Reponsible Provider, Reviewed the pertinent medical history, Discussed the surgical course, Reviewed Intra-OP anesthesia mangement and issues during anesthesia, Set expectations for post-procedure period and Allowed opportunity for questions and acknowledgement of understanding      Vitals: (Last set prior to Anesthesia Care Transfer)    CRNA VITALS  10/12/2020 0053 - 10/12/2020 0128      10/12/2020             NIBP:  126/70    NIBP Mean:  90                Electronically Signed By: Dean Dennis Severson, APRN CRNA  October 12, 2020  1:28 AM

## 2020-10-13 LAB
C TRACH DNA SPEC QL NAA+PROBE: NEGATIVE
N GONORRHOEA DNA SPEC QL NAA+PROBE: NEGATIVE
SPECIMEN SOURCE: NORMAL
SPECIMEN SOURCE: NORMAL

## 2020-11-02 ENCOUNTER — VIRTUAL VISIT (OUTPATIENT)
Dept: FAMILY MEDICINE | Facility: OTHER | Age: 20
End: 2020-11-02
Payer: COMMERCIAL

## 2020-11-02 PROCEDURE — 99421 OL DIG E/M SVC 5-10 MIN: CPT | Performed by: PHYSICIAN ASSISTANT

## 2020-11-02 NOTE — PROGRESS NOTES
"Date: 2020 11:59:49  Clinician: Haley Dalton  Clinician NPI: [id number]  Patient: Rachid Umanzor  Patient : 2000  Patient Address: [address] [place] [id number]  Patient Phone: [phone]  Visit Protocol: URI  Patient Summary:  Rachid is a 20 year old ( : 2000 ) male who initiated a [facility] Visit for COVID-19 (Coronavirus) evaluation and screening. When asked the question \"Please sign me up to receive news, health information and promotions from [facility].\", Rachid responded \"No\".    Rachid states his symptoms started 1-2 days ago.   His symptoms consist of rhinitis, chills, malaise, a sore throat, a cough, and nasal congestion. He is experiencing difficulty breathing due to nasal congestion but he is not short of breath. Rachid also feels feverish but was unable to measure his temperature.   Symptom details     Nasal secretions: The color of his mucus is white and clear.    Cough: Rachid coughs every 5-10 minutes and his cough is not more bothersome at night. Phlegm comes into his throat when he coughs. He believes his cough is caused by post-nasal drip. The color of the phlegm is white and clear.     Sore throat: Rachid reports having mild throat pain (1-3 on a 10 point pain scale), does not have exudate on his tonsils, and can swallow liquids. He is not sure if the lymph nodes in his neck are enlarged. A rash has not appeared on the skin since the sore throat started.      Rachid denies having vomiting, facial pain or pressure, myalgias, teeth pain, ageusia, diarrhea, ear pain, headache, wheezing, nausea, and anosmia. He also denies taking antibiotic medication in the past month and having recent facial or sinus surgery in the past 60 days.   Precipitating events  Rachid is not sure if he has been exposed to someone with strep throat. He has not recently been exposed to someone with influenza. Rachid has been in close contact with the following high risk individuals: people with asthma, heart " disease or diabetes.   Pertinent COVID-19 (Coronavirus) information  Rachid does not work or volunteer as healthcare worker or a . In the past 14 days, Rachid has not worked or volunteered at a healthcare facility or group living setting.   In the past 14 days, he also has not lived in a congregate living setting.   Rachid has had a close contact with a laboratory-confirmed COVID-19 patient within 14 days of symptom onset. He was not exposed at his work. Date Rachid was exposed to the laboratory-confirmed COVID-19 patient: 10/24/2020   Additional information about contact with COVID-19 (Coronavirus) patient as reported by the patient (free text): He was at my house for a few hours and we watched a movie    Since December 2019, Rachid has been tested for COVID-19 and has had upper respiratory infection (URI) or influenza-like illness.      Result of COVID-19 test: Negative     Date of his COVID-19 test: 10/11/2020     Date(s) of previous URI or influenza-like illness (free-text): 5/9/2020 through 5/12/2020     Symptoms Rachid experienced during previous URI or influenza-like illness as reported by the patient (free-text): Feverish, cough, chills, congestion        Pertinent medical history  Rachid does not need a return to work/school note.   Weight: 135 lbs   Rachid does not smoke or use smokeless tobacco.   Weight: 135 lbs    MEDICATIONS: No current medications, ALLERGIES: Zithromax  Clinician Response:  Dear Rachid,   Your symptoms show that you may have coronavirus (COVID-19). This illness can cause fever, cough and trouble breathing. Many people get a mild case and get better on their own. Some people can get very sick.  What should I do?  We would like to test you for this virus.   1. Please call [phone] to schedule your visit. Explain that you were referred by [facility] to have a COVID-19 test. Be ready to share your [facility] visit ID number.  The following will serve as your written order for this COVID Test,  "ordered by me, for the indication of suspected COVID [Z20.828]: The test will be ordered in [employer], our electronic health record, after you are scheduled. It will show as ordered and authorized by Mansoor Tolbert MD.  Order: COVID-19 (Coronavirus) PCR for [facility]TOMATIC [facility] from [facility].   2. When it's time for your COVID test:  Stay at least 6 feet away from others. (If someone will drive you to your test, stay in the backseat, as far away from the  as you can.)   Cover your mouth and nose with a mask, tissue or washcloth.  Go straight to the testing site. Don't make any stops on the way there or back.      3.Starting now: Stay home and away from others (self-isolate) until:   You've had no fever---and no medicine that reduces fever---for one full day (24 hours). And...   Your other symptoms have gotten better. For example, your cough or breathing has improved. And...   At least 10 days have passed since your symptoms started.       During this time, don't leave the house except for testing or medical care.   Stay in your own room, even for meals. Use your own bathroom if you can.   Stay away from others in your home. No hugging, kissing or shaking hands. No visitors.  Don't go to work, school or anywhere else.    Clean \"high touch\" surfaces often (doorknobs, counters, handles, etc.). Use a household cleaning spray or wipes. You'll find a full list of  on the EPA website: www.epa.gov/pesticide-registration/list-n-disinfectants-use-against-sars-cov-2.   Cover your mouth and nose with a mask, tissue or washcloth to avoid spreading germs.  Wash your hands and face often. Use soap and water.  Caregivers in these groups are at risk for severe illness due to COVID-19:  o People 65 years and older  o People who live in a nursing home or long-term care facility  o People with chronic disease (lung, heart, cancer, diabetes, kidney, liver, immunologic)  o People who have a weakened immune system, including those who: "   Are in cancer treatment  Take medicine that weakens the immune system, such as corticosteroids  Had a bone marrow or organ transplant  Have an immune deficiency  Have poorly controlled HIV or AIDS  Are obese (body mass index of 40 or higher)  Smoke regularly   o Caregivers should wear gloves while washing dishes, handling laundry and cleaning bedrooms and bathrooms.  o Use caution when washing and drying laundry: Don't shake dirty laundry, and use the warmest water setting that you can.  o For more tips, go to www.cdc.gov/coronavirus/2019-ncov/downloads/10Things.pdf.    4.Sign up for [employer]. We know it's scary to hear that you might have COVID-19. We want to track your symptoms to make sure you're okay over the next 2 weeks. Please look for an email from [employer]---this is a free, online program that we'll use to keep in touch. To sign up, follow the link in the email. Learn more at [email or web address]  How can I take care of myself?   Get lots of rest. Drink extra fluids (unless a doctor has told you not to).   Take Tylenol (acetaminophen) for fever or pain. If you have liver or kidney problems, ask your family doctor if it's okay to take Tylenol.   Adults can take either:    650 mg (two 325 mg pills) every 4 to 6 hours, or...   1,000 mg (two 500 mg pills) every 8 hours as needed.    Note: Don't take more than 3,000 mg in one day. Acetaminophen is found in many medicines (both prescribed and over-the-counter medicines). Read all labels to be sure you don't take too much.   For children, check the Tylenol bottle for the right dose. The dose is based on the child's age or weight.    If you have other health problems (like cancer, heart failure, an organ transplant or severe kidney disease): Call your specialty clinic if you don't feel better in the next 2 days.       Know when to call 911. Emergency warning signs include:    Trouble breathing or shortness of breath Pain or pressure in the  chest that doesn't go away Feeling confused like you haven't felt before, or not being able to wake up Bluish-colored lips or face.  Where can I get more information?   [facility] -- About COVID-19: [email or web address]   CDC -- What to Do If You're Sick: www.cdc.gov/coronavirus/2019-ncov/about/steps-when-sick.html   CDC -- Ending Home Isolation: www.cdc.gov/coronavirus/2019-ncov/hcp/disposition-in-home-patients.html   [facility] -- Caring for Someone: www.cdc.gov/coronavirus/2019-ncov/if-you-are-sick/care-for-someone.html   [facility] -- Interim Guidance for Hospital Discharge to Home: www.health.[facility].mn./diseases/coronavirus/hcp/hospdischarge.pdf   [facility] clinical trials (COVID-19 research studies): clinicalaffairs.[facility]-clinical-trials    Below are the COVID-19 hotlines at the [facility] of Health ([facility]). Interpreters are available.    For health questions: Call 952-640-0865 or 1-446.331.9901 (7 a.m. to 7 p.m.) For questions about schools and childcare: Call 023-161-5650 or 1-142.916.5322 (7 a.m. to 7 p.m.)    Diagnosis: Contact with and (suspected) exposure to other viral communicable diseases  Diagnosis ICD: Z20.828

## 2020-11-04 ENCOUNTER — AMBULATORY - HEALTHEAST (OUTPATIENT)
Dept: FAMILY MEDICINE | Facility: CLINIC | Age: 20
End: 2020-11-04

## 2020-11-04 DIAGNOSIS — Z20.822 SUSPECTED 2019 NOVEL CORONAVIRUS INFECTION: ICD-10-CM

## 2020-11-05 ENCOUNTER — AMBULATORY - HEALTHEAST (OUTPATIENT)
Dept: FAMILY MEDICINE | Facility: CLINIC | Age: 20
End: 2020-11-05

## 2020-11-05 DIAGNOSIS — Z20.822 SUSPECTED 2019 NOVEL CORONAVIRUS INFECTION: ICD-10-CM

## 2020-11-07 ENCOUNTER — COMMUNICATION - HEALTHEAST (OUTPATIENT)
Dept: SCHEDULING | Facility: CLINIC | Age: 20
End: 2020-11-07

## 2020-11-13 ENCOUNTER — OFFICE VISIT (OUTPATIENT)
Dept: UROLOGY | Facility: CLINIC | Age: 20
End: 2020-11-13
Payer: COMMERCIAL

## 2020-11-13 VITALS
WEIGHT: 140 LBS | BODY MASS INDEX: 23.32 KG/M2 | SYSTOLIC BLOOD PRESSURE: 116 MMHG | DIASTOLIC BLOOD PRESSURE: 70 MMHG | HEIGHT: 65 IN

## 2020-11-13 DIAGNOSIS — N50.3 EPIDIDYMAL CYST: ICD-10-CM

## 2020-11-13 DIAGNOSIS — N44.00 RIGHT TESTICULAR TORSION: Primary | ICD-10-CM

## 2020-11-13 PROCEDURE — 99024 POSTOP FOLLOW-UP VISIT: CPT | Performed by: STUDENT IN AN ORGANIZED HEALTH CARE EDUCATION/TRAINING PROGRAM

## 2020-11-13 ASSESSMENT — MIFFLIN-ST. JEOR: SCORE: 1561.92

## 2020-11-13 ASSESSMENT — PAIN SCALES - GENERAL: PAINLEVEL: NO PAIN (0)

## 2020-11-13 NOTE — NURSING NOTE
Chief Complaint   Patient presents with     Testicular Tortion     Post Op     Betsy Parikh, EMT

## 2020-11-13 NOTE — PROGRESS NOTES
CHIEF COMPLAINT   Rachid Umanzor who is a 20 year old male returns today for follow-up of right testicular torsion s/p scrotal exploration, right testicular detorsion, bilateral scrotal orchiopexy 10/11-10/12/2020    HPI   Rachid Umanzor is a 20 year old male who presents with a history of right testicular torsion s/p scrotal exploration, right testicular detorsion, bilateral scrotal orchiopexy 10/11-10/12/2020    Doing well, no complaints. Incisions healing     PHYSICAL EXAM  Patient is a 20 year old  male   Vitals: There were no vitals taken for this visit.  There is no height or weight on file to calculate BMI.  General Appearance Adult:   Alert, no acute distress, oriented  HENT: throat/mouth:normal, good dentition  Lungs: no respiratory distress, or pursed lip breathing  Heart: No obvious jugular venous distension present  Abdomen: soft, nontender, no organomegaly or masses  Musculoskeltal: extremities normal, no peripheral edema  Skin: no suspicious lesions or rashes  Neuro: Alert, oriented, speech and mentation normal  Psych: affect and mood normal  Gait: Normal  :   circ phallus  bilateral testicles wnl size  Bilateral epididymal head cysts L>R  Bilateral scrotal incisions healing well, sutures have dissolved      Imaging reviewed  10/11/2020 scrotal ultrasound  FINDINGS:     RIGHT: Right testicle measures 5.0 x 3.2 x 3.8 cm. No testicular mass. No blood flow could be documented in the right testicle. The epididymis is large and heterogeneous without blood flow. 1.6 cm right epididymal head cyst. Small hydrocele. No   varicocele.     LEFT: Left testicle measures 4.7 x 2.6 x 2.0 cm. Normal testicle with no masses. Normal arterial duplex and normal color flow. 2.0 cm epididymal head cyst. No hydrocele. Small varicocele.    ASSESSMENT and PLAN  20 year old male who presents with a history of right testicular torsion s/p scrotal exploration, right testicular detorsion, bilateral scrotal orchiopexy  10/11-10/12/2020    Doing well. Bilateral incisions healed, testicle feel about the same size. Epididymal head cysts are benign. Discussed that it seems that the testicle is viable but if it begins to shrink down over the next few months then it may have had permanent ischemic damage from the torsion.    Follow up as needed for urologic issues as they arise    Junior Shaw MD   [facility]  [facility] Phone: [phone]

## 2020-11-13 NOTE — LETTER
11/13/2020       RE: Rachid Umanzor  [address] [facility] [address]     Dear Colleague,    Thank you for referring your patient, Rachid Umanzor, to the [facility] [place] [facility]. Please see a copy of my visit note below.    CHIEF COMPLAINT   Rachid Umanzor who is a 20 year old male returns today for follow-up of right testicular torsion s/p scrotal exploration, right testicular detorsion, bilateral scrotal orchiopexy 10/11-10/12/2020    HPI   Rachid Umanzor is a 20 year old male who presents with a history of right testicular torsion s/p scrotal exploration, right testicular detorsion, bilateral scrotal orchiopexy 10/11-10/12/2020    Doing well, no complaints. Incisions healing     PHYSICAL EXAM  Patient is a 20 year old  male   Vitals: There were no vitals taken for this visit.  There is no height or weight on file to calculate BMI.  General Appearance Adult:   Alert, no acute distress, oriented  HENT: throat/mouth:normal, good dentition  Lungs: no respiratory distress, or pursed lip breathing  Heart: No obvious jugular venous distension present  Abdomen: soft, nontender, no organomegaly or masses  Musculoskeltal: extremities normal, no peripheral edema  Skin: no suspicious lesions or rashes  Neuro: Alert, oriented, speech and mentation normal  Psych: affect and mood normal  Gait: Normal  :   circ phallus  bilateral testicles wnl size  Bilateral epididymal head cysts L>R  Bilateral scrotal incisions healing well, sutures have dissolved      Imaging reviewed  10/11/2020 scrotal ultrasound  FINDINGS:     RIGHT: Right testicle measures 5.0 x 3.2 x 3.8 cm. No testicular mass. No blood flow could be documented in the right testicle. The epididymis is large and heterogeneous without blood flow. 1.6 cm right epididymal head cyst. Small hydrocele. No   varicocele.     LEFT: Left testicle measures 4.7 x 2.6 x 2.0 cm. Normal testicle with no  masses. Normal arterial duplex and normal color flow. 2.0 cm epididymal head cyst. No hydrocele. Small varicocele.    ASSESSMENT and PLAN  20 year old male who presents with a history of right testicular torsion s/p scrotal exploration, right testicular detorsion, bilateral scrotal orchiopexy 10/11-10/12/2020    Doing well. Bilateral incisions healed, testicle feel about the same size. Epididymal head cysts are benign. Discussed that it seems that the testicle is viable but if it begins to shrink down over the next few months then it may have had permanent ischemic damage from the torsion.    Follow up as needed for urologic issues as they arise    Junior Shaw MD   [facility]  [facility] Phone: [phone]

## 2020-12-01 ENCOUNTER — TELEPHONE (OUTPATIENT)
Dept: NURSING | Facility: CLINIC | Age: 20
End: 2020-12-01

## 2020-12-01 NOTE — TELEPHONE ENCOUNTER
.  [facility]: Nurse Triage Note  SITUATION/BACKGROUND                                                      Rachid Umanzor is a 20 year old male s/p SCROTAL EXPLORATION, RIGHT TESTICULAR DETORSION, BILATERAL SCROTAL ORCHI  OPEXY procedure on 10/11/20. Post op follow up on 11/13/20.     Patient stated that incisions are healed.. However, in front of right testicle was slightly swollen yesterday. Appears to have subsided today. Mentioned that he has doing yard work for the past couple days. Intermittent pain at 2-3/10. Afebrile.     In addition, he has noted urinary frequency since Thanksgiving. Urine varies in color from light yellow to dark - dependent on hydration. He has a discomfort sensation after urinating as though still has to urinate.       Routed to Urology at [place] as High Priority to review and follow up call. And schedule appointment at [phone].

## 2020-12-01 NOTE — TELEPHONE ENCOUNTER
Spoke with Rachid. He reports improvement in symptoms over the past day or 2. Advised rest, good support, may use ice intermittently, good water intake. Encouraged pt to call back with any questions or concerns.  DONNA Quintanilla RN       Health Call Center    Phone Message    May a detailed message be left on voicemail: yes     Reason for Call: Other: Pt returning call, please call back.     Action Taken: Other: urology    Travel Screening: Not Applicable

## 2020-12-06 ENCOUNTER — HEALTH MAINTENANCE LETTER (OUTPATIENT)
Age: 20
End: 2020-12-06

## 2021-05-22 ENCOUNTER — ANCILLARY PROCEDURE (OUTPATIENT)
Dept: GENERAL RADIOLOGY | Facility: CLINIC | Age: 21
End: 2021-05-22
Attending: NURSE PRACTITIONER
Payer: COMMERCIAL

## 2021-05-22 ENCOUNTER — OFFICE VISIT (OUTPATIENT)
Dept: URGENT CARE | Facility: URGENT CARE | Age: 21
End: 2021-05-22
Payer: COMMERCIAL

## 2021-05-22 VITALS
SYSTOLIC BLOOD PRESSURE: 130 MMHG | WEIGHT: 158.38 LBS | BODY MASS INDEX: 26.39 KG/M2 | HEIGHT: 65 IN | TEMPERATURE: 99.5 F | DIASTOLIC BLOOD PRESSURE: 84 MMHG | HEART RATE: 103 BPM

## 2021-05-22 DIAGNOSIS — R10.9 ACUTE RIGHT FLANK PAIN: Primary | ICD-10-CM

## 2021-05-22 DIAGNOSIS — M79.18 MUSCULOSKELETAL PAIN: ICD-10-CM

## 2021-05-22 LAB
ALBUMIN SERPL-MCNC: 4.6 G/DL (ref 3.4–5)
ALBUMIN UR-MCNC: NEGATIVE MG/DL
ALP SERPL-CCNC: 80 U/L (ref 40–150)
ALT SERPL W P-5'-P-CCNC: 27 U/L (ref 0–70)
ANION GAP SERPL CALCULATED.3IONS-SCNC: 3 MMOL/L (ref 3–14)
APPEARANCE UR: CLEAR
AST SERPL W P-5'-P-CCNC: 22 U/L (ref 0–45)
BASOPHILS # BLD AUTO: 0 10E9/L (ref 0–0.2)
BASOPHILS NFR BLD AUTO: 0.6 %
BILIRUB SERPL-MCNC: 0.5 MG/DL (ref 0.2–1.3)
BILIRUB UR QL STRIP: NEGATIVE
BUN SERPL-MCNC: 12 MG/DL (ref 7–30)
CALCIUM SERPL-MCNC: 9.7 MG/DL (ref 8.5–10.1)
CHLORIDE SERPL-SCNC: 105 MMOL/L (ref 94–109)
CO2 SERPL-SCNC: 29 MMOL/L (ref 20–32)
COLOR UR AUTO: YELLOW
CREAT SERPL-MCNC: 1.15 MG/DL (ref 0.66–1.25)
DIFFERENTIAL METHOD BLD: NORMAL
EOSINOPHIL # BLD AUTO: 0 10E9/L (ref 0–0.7)
EOSINOPHIL NFR BLD AUTO: 0.3 %
ERYTHROCYTE [DISTWIDTH] IN BLOOD BY AUTOMATED COUNT: 13.8 % (ref 10–15)
GFR SERPL CREATININE-BSD FRML MDRD: 89 ML/MIN/{1.73_M2}
GLUCOSE SERPL-MCNC: 97 MG/DL (ref 70–99)
GLUCOSE UR STRIP-MCNC: NEGATIVE MG/DL
HCT VFR BLD AUTO: 41.1 % (ref 40–53)
HGB BLD-MCNC: 13.3 G/DL (ref 13.3–17.7)
HGB UR QL STRIP: NEGATIVE
KETONES UR STRIP-MCNC: NEGATIVE MG/DL
LEUKOCYTE ESTERASE UR QL STRIP: NEGATIVE
LYMPHOCYTES # BLD AUTO: 1.9 10E9/L (ref 0.8–5.3)
LYMPHOCYTES NFR BLD AUTO: 29.3 %
MCH RBC QN AUTO: 28.8 PG (ref 26.5–33)
MCHC RBC AUTO-ENTMCNC: 32.4 G/DL (ref 31.5–36.5)
MCV RBC AUTO: 89 FL (ref 78–100)
MONOCYTES # BLD AUTO: 0.5 10E9/L (ref 0–1.3)
MONOCYTES NFR BLD AUTO: 7.5 %
NEUTROPHILS # BLD AUTO: 4 10E9/L (ref 1.6–8.3)
NEUTROPHILS NFR BLD AUTO: 62.3 %
NITRATE UR QL: NEGATIVE
PH UR STRIP: 7.5 PH (ref 5–7)
PLATELET # BLD AUTO: 276 10E9/L (ref 150–450)
POTASSIUM SERPL-SCNC: 4.2 MMOL/L (ref 3.4–5.3)
PROT SERPL-MCNC: 8.3 G/DL (ref 6.8–8.8)
RBC # BLD AUTO: 4.62 10E12/L (ref 4.4–5.9)
RBC #/AREA URNS AUTO: ABNORMAL /HPF
SODIUM SERPL-SCNC: 137 MMOL/L (ref 133–144)
SOURCE: ABNORMAL
SP GR UR STRIP: 1.01 (ref 1–1.03)
UROBILINOGEN UR STRIP-ACNC: 0.2 EU/DL (ref 0.2–1)
WBC # BLD AUTO: 6.4 10E9/L (ref 4–11)
WBC #/AREA URNS AUTO: ABNORMAL /HPF

## 2021-05-22 PROCEDURE — 36415 COLL VENOUS BLD VENIPUNCTURE: CPT | Performed by: NURSE PRACTITIONER

## 2021-05-22 PROCEDURE — 99214 OFFICE O/P EST MOD 30 MIN: CPT | Performed by: NURSE PRACTITIONER

## 2021-05-22 PROCEDURE — 80053 COMPREHEN METABOLIC PANEL: CPT | Performed by: NURSE PRACTITIONER

## 2021-05-22 PROCEDURE — 74019 RADEX ABDOMEN 2 VIEWS: CPT | Performed by: RADIOLOGY

## 2021-05-22 PROCEDURE — 85025 COMPLETE CBC W/AUTO DIFF WBC: CPT | Performed by: NURSE PRACTITIONER

## 2021-05-22 PROCEDURE — 81001 URINALYSIS AUTO W/SCOPE: CPT | Performed by: NURSE PRACTITIONER

## 2021-05-22 ASSESSMENT — MIFFLIN-ST. JEOR: SCORE: 1645.26

## 2021-05-22 NOTE — PROGRESS NOTES
Chief Complaint   Patient presents with     Urgent Care     RLQ abdominal pain radiating to right lower back for one week.          ICD-10-CM    1. Acute right flank pain  R10.9    2. Musculoskeletal pain  M79.18      Patient will stop playing basketball daily for the next week and see if symptoms resolve.  He may take ibuprofen or Tylenol as needed for pain.  If fever develops he will go to the emergency room for further evaluation and possible advanced imaging.    Medical Decision Making    Differential Diagnosis:  Includes but is not limited to musculoskeletal pain, back strain, kidney stone, pyelonephritis, cholecystitis, hepatitis, liver failure, bowel obstruction, constipation, perforated bowel    Xray - Reviewed and interpreted by me.  Abdominal x-ray shows no free air or obstruction.    Results for orders placed or performed in visit on 05/22/21 (from the past 24 hour(s))   UA with Microscopic reflex to Culture    Specimen: Midstream Urine   Result Value Ref Range    Color Urine Yellow     Appearance Urine Clear     Glucose Urine Negative NEG^Negative mg/dL    Bilirubin Urine Negative NEG^Negative    Ketones Urine Negative NEG^Negative mg/dL    Specific Gravity Urine 1.010 1.003 - 1.035    pH Urine 7.5 (H) 5.0 - 7.0 pH    Protein Albumin Urine Negative NEG^Negative mg/dL    Urobilinogen Urine 0.2 0.2 - 1.0 EU/dL    Nitrite Urine Negative NEG^Negative    Blood Urine Negative NEG^Negative    Leukocyte Esterase Urine Negative NEG^Negative    Source Midstream Urine     WBC Urine 0 - 5 OTO5^0 - 5 /HPF    RBC Urine O - 2 OTO2^O - 2 /HPF   Comprehensive metabolic panel (BMP + Alb, Alk Phos, ALT, AST, Total. Bili, TP)   Result Value Ref Range    Sodium 137 133 - 144 mmol/L    Potassium 4.2 3.4 - 5.3 mmol/L    Chloride 105 94 - 109 mmol/L    Carbon Dioxide 29 20 - 32 mmol/L    Anion Gap 3 3 - 14 mmol/L    Glucose 97 70 - 99 mg/dL    Urea Nitrogen 12 7 - 30 mg/dL    Creatinine 1.15 0.66 - 1.25 mg/dL    GFR Estimate 89  ">60 mL/min/[1.73_m2]    GFR Estimate If Black >90 >60 mL/min/[1.73_m2]    Calcium 9.7 8.5 - 10.1 mg/dL    Bilirubin Total 0.5 0.2 - 1.3 mg/dL    Albumin 4.6 3.4 - 5.0 g/dL    Protein Total 8.3 6.8 - 8.8 g/dL    Alkaline Phosphatase 80 40 - 150 U/L    ALT 27 0 - 70 U/L    AST 22 0 - 45 U/L   CBC with platelets and differential   Result Value Ref Range    WBC 6.4 4.0 - 11.0 10e9/L    RBC Count 4.62 4.4 - 5.9 10e12/L    Hemoglobin 13.3 13.3 - 17.7 g/dL    Hematocrit 41.1 40.0 - 53.0 %    MCV 89 78 - 100 fl    MCH 28.8 26.5 - 33.0 pg    MCHC 32.4 31.5 - 36.5 g/dL    RDW 13.8 10.0 - 15.0 %    Platelet Count 276 150 - 450 10e9/L    % Neutrophils 62.3 %    % Lymphocytes 29.3 %    % Monocytes 7.5 %    % Eosinophils 0.3 %    % Basophils 0.6 %    Absolute Neutrophil 4.0 1.6 - 8.3 10e9/L    Absolute Lymphocytes 1.9 0.8 - 5.3 10e9/L    Absolute Monocytes 0.5 0.0 - 1.3 10e9/L    Absolute Eosinophils 0.0 0.0 - 0.7 10e9/L    Absolute Basophils 0.0 0.0 - 0.2 10e9/L    Diff Method Automated Method    XR Abdomen 2 Views    Narrative    ABDOMEN TWO-THREE VIEWS  5/22/2021 2:46 PM     HISTORY: Right lower quadrant abdominal pain.     COMPARISON: None.    FINDINGS: Small amount of stool. No free air. There are no air filled  distended loops of small bowel. The colon is not distended. The lung  bases are unremarkable.      Impression    IMPRESSION: Nonobstructed bowel gas pattern.    NELA CARUSO MD       Subjective     Rachid Umanzor is an 20 year old male who presents to clinic today for right flank and thoracic back pain. Started about 2 weeks ago after working in the yard and planting trees. symptoms have been intermittent since then. Movement makes the discomfort better. Usually starts after he's been up and about for a bit in the moring.     ROS: 10 point ROS neg other than the symptoms noted above in the HPI.       Objective    /84   Pulse 103   Temp 99.5  F (37.5  C) (Tympanic)   Ht 1.651 m (5' 5\")   Wt 71.8 kg (158 lb 6 " oz)   BMI 26.35 kg/m      Physical Exam       GENERAL APPEARANCE: healthy appearing, alert     EYES: PERRL, EOMI, sclera non-icteric     HENT: oral exam benign, mucus membranes intact, without ulcers or lesions     NECK: no adenopathy or asymmetry, thyroid normal to palpation     RESP: lungs clear to auscultation - no rales, rhonchi or wheezes     CV: regular rates and rhythm, no murmurs, rubs, or gallop     ABDOMEN: Bowel sounds are normal, abdomen is soft with very minor tenderness in the right upper quadrant around to the flank     MS: extremities normal- no gross deformities noted; normal muscle tone.     SKIN: no suspicious lesions or rashes     NEURO: Normal strength and tone, mentation intact and speech normal     PSYCH: normal thought process; no significant mood disturbance    Patient Instructions     Patient Education     Muscle Strain in the Abdomen  A muscle strain is a stretching or tearing of the muscle fibers. It is also called a pulled muscle. The abdomen is protected by a thick wall of muscle in the front and sides. These muscles help with twisting and bending forward. Too much coughing, lifting heavy objects, or sudden jerking movements can sometimes cause a muscle strain in the abdomen. This causes pain that is worse when you move. The area may also feel tender or look swollen and bruised.  Home care    Apply an ice pack over the injured area for 15 to 20 minutes every 3 to 6 hours. Do this for the first 24 to 48 hours. You can make an ice pack by filling a plastic bag that seals at the top with ice cubes and then wrapping it with a thin towel. Be careful not to injure your skin with the ice treatments. Ice should never be applied directly to skin. Continue the use of ice packs for relief of pain and swelling as needed. After 48 hours, apply heat (warm shower or warm bath) for 15 to 20 minutes several times a day, or alternate ice and heat.    You may use over-the-counter pain medicine to control  pain, unless another pain medicine was prescribed. If you have liver or kidney disease, a stomach ulcer or gastrointestinal bleeding, talk with your healthcare provider before using these medicines.  Follow-up care  Follow up with your healthcare provider, or as advised.  Call 911  Call 911 if you have:    Weakness, lightheaded, or faint    Chest pain  When to seek medical advice  Call your healthcare provider right away if any of these occur:    Pain gets worse or moves to the right lower abdomen, just below the waistline    Fever of 100.4 F (38 C) or higher, or chills, or as directed by your healthcare provider    Vomiting    Severe abdominal pain that spreads to the back or toward the groin    Blood in the urine    Unexpected vaginal bleeding in women  [employer] last reviewed this educational content on 5/1/2018 2000-2021 [employer]. All rights reserved. This information is not intended as a substitute for professional medical care. Always follow your healthcare professional's instructions.               CHENG Pitts, CNP  [facility] Provider

## 2021-05-22 NOTE — PATIENT INSTRUCTIONS
Patient Education     Muscle Strain in the Abdomen  A muscle strain is a stretching or tearing of the muscle fibers. It is also called a pulled muscle. The abdomen is protected by a thick wall of muscle in the front and sides. These muscles help with twisting and bending forward. Too much coughing, lifting heavy objects, or sudden jerking movements can sometimes cause a muscle strain in the abdomen. This causes pain that is worse when you move. The area may also feel tender or look swollen and bruised.  Home care    Apply an ice pack over the injured area for 15 to 20 minutes every 3 to 6 hours. Do this for the first 24 to 48 hours. You can make an ice pack by filling a plastic bag that seals at the top with ice cubes and then wrapping it with a thin towel. Be careful not to injure your skin with the ice treatments. Ice should never be applied directly to skin. Continue the use of ice packs for relief of pain and swelling as needed. After 48 hours, apply heat (warm shower or warm bath) for 15 to 20 minutes several times a day, or alternate ice and heat.    You may use over-the-counter pain medicine to control pain, unless another pain medicine was prescribed. If you have liver or kidney disease, a stomach ulcer or gastrointestinal bleeding, talk with your healthcare provider before using these medicines.  Follow-up care  Follow up with your healthcare provider, or as advised.  Call 911  Call 911 if you have:    Weakness, lightheaded, or faint    Chest pain  When to seek medical advice  Call your healthcare provider right away if any of these occur:    Pain gets worse or moves to the right lower abdomen, just below the waistline    Fever of 100.4 F (38 C) or higher, or chills, or as directed by your healthcare provider    Vomiting    Severe abdominal pain that spreads to the back or toward the groin    Blood in the urine    Unexpected vaginal bleeding in women  Luis last reviewed this educational content on  5/1/2018 2000-2021 [employer]. All rights reserved. This information is not intended as a substitute for professional medical care. Always follow your healthcare professional's instructions.

## 2021-08-28 ENCOUNTER — HOSPITAL ENCOUNTER (EMERGENCY)
Facility: CLINIC | Age: 21
Discharge: HOME | End: 2021-08-28
Admitting: PHYSICIAN ASSISTANT
Payer: COMMERCIAL

## 2021-08-28 ENCOUNTER — APPOINTMENT (OUTPATIENT)
Dept: RADIOLOGY | Facility: CLINIC | Age: 21
End: 2021-08-28
Payer: COMMERCIAL

## 2021-08-28 VITALS
TEMPERATURE: 98.6 F | SYSTOLIC BLOOD PRESSURE: 115 MMHG | DIASTOLIC BLOOD PRESSURE: 90 MMHG | WEIGHT: 160 LBS | RESPIRATION RATE: 16 BRPM | HEART RATE: 94 BPM | OXYGEN SATURATION: 98 % | BODY MASS INDEX: 26.63 KG/M2

## 2021-08-28 DIAGNOSIS — S52.602A CLOSED FRACTURE DISTAL RADIUS AND ULNA, LEFT, INITIAL ENCOUNTER: ICD-10-CM

## 2021-08-28 DIAGNOSIS — S52.502A CLOSED FRACTURE DISTAL RADIUS AND ULNA, LEFT, INITIAL ENCOUNTER: ICD-10-CM

## 2021-08-28 PROCEDURE — 96374 THER/PROPH/DIAG INJ IV PUSH: CPT

## 2021-08-28 PROCEDURE — 99285 EMERGENCY DEPT VISIT HI MDM: CPT | Mod: 25

## 2021-08-28 PROCEDURE — 96376 TX/PRO/DX INJ SAME DRUG ADON: CPT

## 2021-08-28 PROCEDURE — 73110 X-RAY EXAM OF WRIST: CPT | Mod: LT

## 2021-08-28 PROCEDURE — 96375 TX/PRO/DX INJ NEW DRUG ADDON: CPT

## 2021-08-28 PROCEDURE — 250N000011 HC RX IP 250 OP 636: Performed by: PHYSICIAN ASSISTANT

## 2021-08-28 PROCEDURE — 25600 CLTX DST RDL FX/EPHYS SEP WO: CPT | Mod: LT

## 2021-08-28 RX ORDER — ONDANSETRON 2 MG/ML
4 INJECTION INTRAMUSCULAR; INTRAVENOUS ONCE
Status: COMPLETED | OUTPATIENT
Start: 2021-08-28 | End: 2021-08-28

## 2021-08-28 RX ORDER — OXYCODONE AND ACETAMINOPHEN 5; 325 MG/1; MG/1
1-2 TABLET ORAL EVERY 4 HOURS PRN
Qty: 12 TABLET | Refills: 0 | Status: SHIPPED | OUTPATIENT
Start: 2021-08-28 | End: 2022-05-06

## 2021-08-28 RX ADMIN — ONDANSETRON 4 MG: 2 INJECTION INTRAMUSCULAR; INTRAVENOUS at 17:02

## 2021-08-28 RX ADMIN — HYDROMORPHONE HYDROCHLORIDE 1 MG: 1 INJECTION, SOLUTION INTRAMUSCULAR; INTRAVENOUS; SUBCUTANEOUS at 18:19

## 2021-08-28 RX ADMIN — HYDROMORPHONE HYDROCHLORIDE 1 MG: 1 INJECTION, SOLUTION INTRAMUSCULAR; INTRAVENOUS; SUBCUTANEOUS at 17:02

## 2021-08-28 ASSESSMENT — ENCOUNTER SYMPTOMS
BACK PAIN: 0
MYALGIAS: 0
NECK PAIN: 0
NUMBNESS: 0

## 2021-08-28 NOTE — ED PROVIDER NOTES
EMERGENCY DEPARTMENT ENCOUNTER      NAME: Rachid Umanzor  AGE: 21 year old male  YOB: 2000  MRN: [id number]  EVALUATION DATE & TIME: 8/28/2021  4:18 PM    PCP: Saadia Mckee    ED PROVIDER: Tc Barry PA-C      Chief Complaint   Patient presents with     Arm Injury         FINAL IMPRESSION:  1. Closed fracture distal radius and ulna, left, initial encounter          MEDICAL DECISION MAKING:    Pertinent Labs & Imaging studies reviewed. (See chart for details)  21 year old male presents to the Emergency Department for evaluation of left wrist pain status post skateboarding accident.  No reports of head injury or additional complaints of pain.    After initially examining patient, reviewing vitals and obtaining history present illness there is an obvious displaced fracture involving the left wrist.  Minor abrasion of the skin but no true open fracture.    X-rays were obtained.  Patient was given pain medication, nausea medication.  He was placed in the finger traps and had traction applied.  Patient was placed in a sugar tong splint involving the left forearm and then placed in a sling.  Distal CMS intact pre and post splint application.  Referrals for outpatient follow-up through hand and wrist orthopedics as well as pain medication was given for the patient, I do believe this injury will likely require surgical repair.  Overall patient and family the bedside comfortable plan of care.        ED COURSE  4:40 PM  I met with the patient, obtained history, performed an initial exam, and discussed options and plan for diagnostics and treatment here in the ED. PPE: N95 mask, surgical mask, gloves  6:20 PM I rechecked on the patient and updated them on results.     At the conclusion of the encounter I discussed the results of all of the tests and the disposition. The questions were answered. The patient or family acknowledged understanding and was agreeable with the care plan.     MEDICATIONS GIVEN IN THE  EMERGENCY:  Medications   HYDROmorphone (DILAUDID) injection 1 mg (1 mg Intravenous Given 8/28/21 1702)   ondansetron (ZOFRAN) injection 4 mg (4 mg Intravenous Given 8/28/21 1702)   HYDROmorphone (DILAUDID) injection 1 mg (1 mg Intravenous Given 8/28/21 1819)       NEW PRESCRIPTIONS STARTED AT TODAY'S ER VISIT  New Prescriptions    OXYCODONE-ACETAMINOPHEN (PERCOCET) 5-325 MG TABLET    Take 1-2 tablets by mouth every 4 hours as needed for pain            =================================================================    HPI    Patient information was obtained from: patient    Use of Interpretor: N/A         Rachid Umanzor is a 21 year old male with no pertinent history who presents to this ED via walk-in for evaluation of wrist injury. The patient was learning to skateboard this afternoon when he fell as he was adjusting his stance. He landed on the left wrist. Since then, he complains of left wrist pain and swelling with an obvious deformity. He did not hit his head or have any loss of consciousness. He denies any other pain or injuries, numbness, or any other complaints at this time. He is not on blood thinners. He is otherwise healthy.       REVIEW OF SYSTEMS   Review of Systems   HENT:        Denies head injuries.    Musculoskeletal: Negative for back pain, myalgias and neck pain.        Positive for left wrist injury with associated pain, swelling, and deformity.   Neurological: Negative for syncope and numbness.   All other systems reviewed and are negative.       PAST MEDICAL HISTORY:  Past Medical History:   Diagnosis Date     Arachnoid cyst     asx     Short stature, familial 2/17/2016       PAST SURGICAL HISTORY:  Past Surgical History:   Procedure Laterality Date     ORCHIOPEXY Bilateral 10/11/2020    Procedure: SCROTAL EXPLORATION, RIGHT TESTICULAR DETORSION, BILATERAL SCROTAL ORCHIOPEXY;  Surgeon: Junior Shaw MD;  Location: [facility] OR         CURRENT MEDICATIONS:    No current facility-administered  medications for this encounter.    Current Outpatient Medications:      oxyCODONE-acetaminophen (PERCOCET) 5-325 MG tablet, Take 1-2 tablets by mouth every 4 hours as needed for pain, Disp: 12 tablet, Rfl: 0     NO ACTIVE MEDICATIONS, ., Disp: , Rfl:       ALLERGIES:  Allergies   Allergen Reactions     Zithromax [Azithromycin] Hives       FAMILY HISTORY:  No family history on file.    SOCIAL HISTORY:   Social History     Socioeconomic History     Marital status: Single     Spouse name: Not on file     Number of children: Not on file     Years of education: Not on file     Highest education level: Not on file   Occupational History     Not on file   Tobacco Use     Smoking status: Never Smoker     Smokeless tobacco: Never Used   Substance and Sexual Activity     Alcohol use: No     Alcohol/week: 0.0 standard drinks     Drug use: No     Sexual activity: Never     Comment: 2/17/2016 l.n.   Other Topics Concern     Not on file   Social History Narrative     Not on file     Social Determinants of Health     Financial Resource Strain:      Difficulty of Paying Living Expenses:    Food Insecurity:      Worried About Running Out of Food in the Last Year:      Ran Out of Food in the Last Year:    Transportation Needs:      Lack of Transportation (Medical):      Lack of Transportation (Non-Medical):    Physical Activity:      Days of Exercise per Week:      Minutes of Exercise per Session:    Stress:      Feeling of Stress :    Social Connections:      Frequency of Communication with Friends and Family:      Frequency of Social Gatherings with Friends and Family:      Attends [employer] Services:      Active Member of Clubs or Organizations:      Attends Club or Organization Meetings:      Marital Status:    Intimate Partner Violence:      Fear of Current or Ex-Partner:      Emotionally Abused:      Physically Abused:      Sexually Abused:        VITALS:  Patient Vitals for the past 24 hrs:   BP Temp Temp src Pulse Resp SpO2  Weight   08/28/21 1605 113/68 98.6  F (37  C) Oral 107 16 98 % 72.6 kg (160 lb)       PHYSICAL EXAM    Physical Exam  Vitals and nursing note reviewed.   Constitutional:       General: He is not in acute distress.     Appearance: Normal appearance. He is not ill-appearing or toxic-appearing.   HENT:      Head: Normocephalic and atraumatic.      Mouth/Throat:      Pharynx: No posterior oropharyngeal erythema.   Eyes:      Extraocular Movements: Extraocular movements intact.      Conjunctiva/sclera: Conjunctivae normal.   Pulmonary:      Effort: Pulmonary effort is normal.      Breath sounds: Normal breath sounds.   Musculoskeletal:         General: Swelling, tenderness, deformity and signs of injury present. Normal range of motion.      Cervical back: Normal range of motion and neck supple.      Comments: Obvious deformity involving the left wrist with angulation of the dorsal left wrist.  Intact distal radial ulnar pulses involving the left upper extremity.  Patient can move all digits freely, normal sensation of the distal fingertips all intact.  Nontender left elbow and left shoulder examination.  Remainder of musculoskeletal exam is benign.   Skin:     General: Skin is warm and dry.      Coloration: Skin is not jaundiced.      Findings: No bruising, erythema or rash.      Comments: Tiny abrasion less than a centimeter in size with no significant bleeding volar aspect of the left wrist, no additional skin abnormalities.   Neurological:      General: No focal deficit present.      Mental Status: He is alert and oriented to person, place, and time. Mental status is at baseline.      Sensory: No sensory deficit.      Motor: No weakness.   Psychiatric:         Mood and Affect: Mood normal.         Behavior: Behavior normal.         Judgment: Judgment normal.          LAB:  All pertinent labs reviewed and interpreted.  Results for orders placed or performed during the hospital encounter of 08/28/21   XR Wrist Left G/E  3 Views    Impression    IMPRESSION: There is a comminuted and impacted fracture of the distal aspect of the left radius with dorsal angulation of the distal fracture fragment and extension into the DRUJ and wrist joint. There is a fracture of the ulnar styloid. No carpal   fractures are identified. The bones of the carpus have moved with the distal fracture fragment but remain aligned with the articular margin of the distal radius.       RADIOLOGY:  Reviewed all pertinent imaging. Please see official radiology report.  XR Wrist Left G/E 3 Views   Final Result   IMPRESSION: There is a comminuted and impacted fracture of the distal aspect of the left radius with dorsal angulation of the distal fracture fragment and extension into the DRUJ and wrist joint. There is a fracture of the ulnar styloid. No carpal    fractures are identified. The bones of the carpus have moved with the distal fracture fragment but remain aligned with the articular margin of the distal radius.            PROCEDURES:   Splint application:    After pain medication nausea medicine was given I did place the patient in finger traps to provide traction to the injury.  Total of 10 to 15 minutes was spent in the traps and final amount of weight that was applied with 6 pounds.  Patient did tolerate this well.  I did reduce the fracture fragment applying pressure to the dorsal aspect of the fracture, gross alignment now significantly improved.  Using Ortho-Glass I placed the patient in a sugar tong splint held in place with Ace bandage.  I then placed him in a sling at approximately 90 degrees flexion.  After the reduction distal CMS intact, normal capillary refill, normal sensation in all the fingertips.  Patient reports pain is significantly improved.      I, Rakel Vasquez, am serving as a scribe to document services personally performed by Tc Baryr PA-C based on my observation and the provider's statements to me. Tc OLIVO PA-C  attest that Rakel Vasquez is acting in a scribe capacity, has observed my performance of the services and has documented them in accordance with my direction.    Tc Barry PA-C  Emergency Medicine  [facility]       Tc Barry PA-C  08/28/21 190

## 2021-08-28 NOTE — DISCHARGE INSTRUCTIONS
Please keep your arm in the splint and sling as much as possible until assessed by orthopedics.  Use the pain medication as needed.  You may also use ibuprofen in between the pain medication if you desire.

## 2021-08-28 NOTE — Clinical Note
Patient verbalized an understanding of the discharge instructions. Patient also verbalized an understanding of how to take prescribed and suggested OTC medications. Patient understands follow-up instructions as well.

## 2021-08-30 ENCOUNTER — TRANSFERRED RECORDS (OUTPATIENT)
Dept: HEALTH INFORMATION MANAGEMENT | Facility: CLINIC | Age: 21
End: 2021-08-30

## 2021-08-30 ENCOUNTER — PATIENT OUTREACH (OUTPATIENT)
Dept: PEDIATRICS | Facility: CLINIC | Age: 21
End: 2021-08-30

## 2021-08-30 NOTE — TELEPHONE ENCOUNTER
What type of discharge? Emergency Department  Risk of Hospital admission or ED visit: 43%  Is a TCM episode required? No  When should the patient follow up with PCP? within 30 days of discharge.    Agnes Rasmussen RN  [facility]

## 2021-08-31 NOTE — TELEPHONE ENCOUNTER
"Has appointments scheduled for [facility] test and [facility], no wanting visit right now with PCP no questions or concerns    ED for acute condition Discharge Protocol    \"Hi, my name is Rob Carroll RN, a registered nurse, and I am calling from [facility].  I am calling to follow up and see how things are going for you after your recent emergency visit.\"    Tell me how you are doing now that you are home?\" good have surgery scheduled for arm on Thursday       Discharge Instructions    \"Let's review your discharge instructions.  What is/are the follow-up recommendations?  Pt. Response: already took care of it    \"Has an appointment with your primary care provider been scheduled?\"  No (not needed)    Medications    \"Tell me what changed about your medicines when you discharged?\"    On pain medication no questions    \"What questions do you have about your medications?\"   None        Call Summary    \"What questions or concerns do you have about your recent visit and your follow-up care?\"     none    \"If you have questions or things don't continue to improve, we encourage you contact us through the main clinic number (give number).  Even if the clinic is not open, triage nurses are available 24/7 to help you.     We would like you to know that our clinic has extended hours (provide information).  We also have urgent care (provide details on closest location and hours/contact info)\"    \"Thank you for your time and take care!\"                "

## 2021-09-02 ENCOUNTER — TRANSFERRED RECORDS (OUTPATIENT)
Dept: HEALTH INFORMATION MANAGEMENT | Facility: CLINIC | Age: 21
End: 2021-09-02

## 2021-09-14 ENCOUNTER — TRANSFERRED RECORDS (OUTPATIENT)
Dept: HEALTH INFORMATION MANAGEMENT | Facility: CLINIC | Age: 21
End: 2021-09-14

## 2021-09-23 ENCOUNTER — TRANSFERRED RECORDS (OUTPATIENT)
Dept: HEALTH INFORMATION MANAGEMENT | Facility: CLINIC | Age: 21
End: 2021-09-23

## 2021-09-24 ENCOUNTER — TRANSFERRED RECORDS (OUTPATIENT)
Dept: HEALTH INFORMATION MANAGEMENT | Facility: CLINIC | Age: 21
End: 2021-09-24

## 2021-09-25 ENCOUNTER — HEALTH MAINTENANCE LETTER (OUTPATIENT)
Age: 21
End: 2021-09-25

## 2021-10-05 ENCOUNTER — TRANSFERRED RECORDS (OUTPATIENT)
Dept: HEALTH INFORMATION MANAGEMENT | Facility: CLINIC | Age: 21
End: 2021-10-05

## 2021-11-02 ENCOUNTER — TRANSFERRED RECORDS (OUTPATIENT)
Dept: HEALTH INFORMATION MANAGEMENT | Facility: CLINIC | Age: 21
End: 2021-11-02
Payer: COMMERCIAL

## 2022-01-15 ENCOUNTER — HEALTH MAINTENANCE LETTER (OUTPATIENT)
Age: 22
End: 2022-01-15

## 2022-02-12 ENCOUNTER — E-VISIT (OUTPATIENT)
Dept: PEDIATRICS | Facility: CLINIC | Age: 22
End: 2022-02-12
Payer: COMMERCIAL

## 2022-02-12 DIAGNOSIS — N50.819 PAIN IN TESTICLE, UNSPECIFIED LATERALITY: Primary | ICD-10-CM

## 2022-02-12 PROCEDURE — 99207 PR NON-BILLABLE SERV PER CHARTING: CPT | Performed by: PEDIATRICS

## 2022-02-15 ENCOUNTER — TELEPHONE (OUTPATIENT)
Dept: UROLOGY | Facility: CLINIC | Age: 22
End: 2022-02-15
Payer: COMMERCIAL

## 2022-02-15 ENCOUNTER — TELEPHONE (OUTPATIENT)
Dept: INTERNAL MEDICINE | Facility: CLINIC | Age: 22
End: 2022-02-15
Payer: COMMERCIAL

## 2022-02-15 DIAGNOSIS — N50.819 TESTICULAR PAIN: Primary | ICD-10-CM

## 2022-02-15 NOTE — TELEPHONE ENCOUNTER
Pt sent a mychart e-visit on 2/12 for the following:        Pt says that the pain has decreased some, but still occasional. He is planning to call Urology to schedule appt.    Seen by Urology for this on 11/13/2020, for follow-up of right testicular torsion s/p scrotal exploration, right testicular detorsion, bilateral scrotal orchiopexy 10/11-10/12/2020  [facility] [place]  Junior Shaw MD

## 2022-02-15 NOTE — TELEPHONE ENCOUNTER
"0216  [facility]    Per MD- Given patient's history of Testicular Torsion and orchiopexy. \"Scrotal ultrasound, see him within 1-2 weeks\"    US ordered, will have scheduling reach out to patient.     Katie Monroy LPN    "

## 2022-02-15 NOTE — TELEPHONE ENCOUNTER
[facility] Call Center    Phone Message    May a detailed message be left on voicemail: yes     Reason for Call: Patient has a virtual visit with Dr. Alvarez in May. Patient is concerned about that being too far out because of the pain. Would like to speak to someone about appt. Please reach out to patient. Thank you.     Action Taken: Message routed to:  [facility] ([facility] Uro    Travel Screening: Not Applicable

## 2022-02-16 ENCOUNTER — HOSPITAL ENCOUNTER (OUTPATIENT)
Dept: ULTRASOUND IMAGING | Facility: CLINIC | Age: 22
Discharge: HOME | End: 2022-02-16
Attending: STUDENT IN AN ORGANIZED HEALTH CARE EDUCATION/TRAINING PROGRAM | Admitting: STUDENT IN AN ORGANIZED HEALTH CARE EDUCATION/TRAINING PROGRAM
Payer: COMMERCIAL

## 2022-02-16 DIAGNOSIS — N50.819 TESTICULAR PAIN: ICD-10-CM

## 2022-02-16 PROCEDURE — 76870 US EXAM SCROTUM: CPT

## 2022-05-02 ENCOUNTER — VIRTUAL VISIT (OUTPATIENT)
Dept: UROLOGY | Facility: CLINIC | Age: 22
End: 2022-05-02
Payer: COMMERCIAL

## 2022-05-02 VITALS — WEIGHT: 145 LBS | HEIGHT: 65 IN | BODY MASS INDEX: 24.16 KG/M2

## 2022-05-02 DIAGNOSIS — I86.1 LEFT VARICOCELE: ICD-10-CM

## 2022-05-02 DIAGNOSIS — N50.3 EPIDIDYMAL CYST: Primary | ICD-10-CM

## 2022-05-02 PROCEDURE — 99212 OFFICE O/P EST SF 10 MIN: CPT | Mod: GT | Performed by: STUDENT IN AN ORGANIZED HEALTH CARE EDUCATION/TRAINING PROGRAM

## 2022-05-02 NOTE — LETTER
5/2/2022       RE: Rachid Umanzor  [address] [id number]     Dear Colleague,    Thank you for referring your patient, Rachid Umanzor, to the [facility] [place] at [facility]. Please see a copy of my visit note below.    Rachid is a 21 year old who is being evaluated via a billable video visit.      **MY CHART**  How would you like to obtain your AVS? MyChart  If the video visit is dropped, the invitation should be resent by: Other e-mail: MY CHART  Will anyone else be joining your video visit? No  HPI:  Rachid Umanzor is a 21 year old male being seen for history of testicular pain.  Duration of problem: 2 months ago  Previous treatments: Bilateral scrotal orchiopexy in 2020      Reviewed previous notes from Dr. Valeria Dickinson some issues with testicular pain in the past  Not having any more pain here to follow-up with his ultrasound of scrotum         Review of Systems:  From intake questionnaire     Skin: negative  Eyes: negative  Ears/Nose/Throat: negative  Respiratory: No shortness of breath, dyspnea on exertion, cough, or hemoptysis  Cardiovascular: No chest pain or palpitations  Gastrointestinal: negative; no nausea/vomiting, constipation or diarrhea  Genitourinary: as per HPI  Musculoskeletal: negative  Neurologic: negative  Psychiatric: negative  Hematologic/Lymphatic/Immunologic: negative  Endocrine: negative         Physical Exam:   This is a virtual visit    Alert, no acute distress, oriented, conversant    Ears/nose/mouth: mouth:normal, good dentition  Respiratory: no respiratory distress, or pursed lip breathing  Cardiovascular:no obvious jugular venous distension present  Skin: no suspicious lesions or rashes on Visible body parts on the Screen  Neuro: Alert, oriented, speech and mentation normal  Psych: affect and mood normal, alert and oriented to person, place and time  Review of Imaging:  The following imaging  exams were independently viewed and interpreted by me and discussed with patient:  Scrotal Ultrasound: Abnormal: Small left varicocele  Bilateral epididymal cysts    Review of Labs:  The following labs were reviewed by me and discussed with the patient:      Assessment & Plan     Epididymal cyst  Discussed about the significance of the small epididymal cyst  Recommended follow-up as needed    Left varicocele  Discussed in detail about the pathophysiology of varicocele and the likely issues that can arise with 1  In view of his paucity of symptoms and the fact that he can manage these well  Recommended follow-up as needed        Fernando Alvarez MD  [facility]      ==========================    Additional Billing and Coding Information:  Review of external notes as documented above   Review of the result(s) of each unique test - Ultrasound scrotum    Independent interpretation of a test performed by another physician/other qualified health care professional (not separately reported) -       Discussion of management or test interpretation with external physician/other qualified healthcare professional/appropriate source -       Diagnosis or treatment significantly limited by social determinants of health -       10 minutes spent on the date of the encounter doing chart review, review of test results, interpretation of tests, patient visit and documentation Annie Lemos CMA    Video Start Time: 4:30 PM  Video-Visit Details    Type of service:  Video Visit    Video End Time:4:34 PM    Originating Location (pt. Location): Home    Distant Location (provider location):  [facility]     Platform used for Video Visit: Babybe

## 2022-05-02 NOTE — PROGRESS NOTES
Rachid is a 21 year old who is being evaluated via a billable video visit.      **MY CHART**  How would you like to obtain your AVS? MyChart  If the video visit is dropped, the invitation should be resent by: Other e-mail: MY CHART  Will anyone else be joining your video visit? No  HPI:  Rachid Umanzor is a 21 year old male being seen for history of testicular pain.  Duration of problem: 2 months ago  Previous treatments: Bilateral scrotal orchiopexy in 2020      Reviewed previous notes from Dr. Valeria Dickinson some issues with testicular pain in the past  Not having any more pain here to follow-up with his ultrasound of scrotum         Review of Systems:  From intake questionnaire     Skin: negative  Eyes: negative  Ears/Nose/Throat: negative  Respiratory: No shortness of breath, dyspnea on exertion, cough, or hemoptysis  Cardiovascular: No chest pain or palpitations  Gastrointestinal: negative; no nausea/vomiting, constipation or diarrhea  Genitourinary: as per HPI  Musculoskeletal: negative  Neurologic: negative  Psychiatric: negative  Hematologic/Lymphatic/Immunologic: negative  Endocrine: negative         Physical Exam:   This is a virtual visit    Alert, no acute distress, oriented, conversant    Ears/nose/mouth: mouth:normal, good dentition  Respiratory: no respiratory distress, or pursed lip breathing  Cardiovascular:no obvious jugular venous distension present  Skin: no suspicious lesions or rashes on Visible body parts on the Screen  Neuro: Alert, oriented, speech and mentation normal  Psych: affect and mood normal, alert and oriented to person, place and time  Review of Imaging:  The following imaging exams were independently viewed and interpreted by me and discussed with patient:  Scrotal Ultrasound: Abnormal: Small left varicocele  Bilateral epididymal cysts    Review of Labs:  The following labs were reviewed by me and discussed with the patient:      Assessment & Plan     Epididymal cyst  Discussed about  the significance of the small epididymal cyst  Recommended follow-up as needed    Left varicocele  Discussed in detail about the pathophysiology of varicocele and the likely issues that can arise with 1  In view of his paucity of symptoms and the fact that he can manage these well  Recommended follow-up as needed        Fernando Alvarez MD  [facility]OLOG[facility]      ==========================    Additional Billing and Coding Information:  Review of external notes as documented above   Review of the result(s) of each unique test - Ultrasound scrotum    Independent interpretation of a test performed by another physician/other qualified health care professional (not separately reported) -       Discussion of management or test interpretation with external physician/other qualified healthcare professional/appropriate source -       Diagnosis or treatment significantly limited by social determinants of health -       10 minutes spent on the date of the encounter doing chart review, review of test results, interpretation of tests, patient visit and documentation Annie Lemos CMA    Video Start Time: 4:30 PM  Video-Visit Details    Type of service:  Video Visit    Video End Time:4:34 PM    Originating Location (pt. Location): Home    Distant Location (provider location):  [facility]     Platform used for Video Visit: Marley

## 2022-05-06 ENCOUNTER — OFFICE VISIT (OUTPATIENT)
Dept: PEDIATRICS | Facility: CLINIC | Age: 22
End: 2022-05-06
Payer: COMMERCIAL

## 2022-05-06 VITALS
SYSTOLIC BLOOD PRESSURE: 120 MMHG | RESPIRATION RATE: 16 BRPM | BODY MASS INDEX: 23.46 KG/M2 | OXYGEN SATURATION: 99 % | WEIGHT: 141 LBS | DIASTOLIC BLOOD PRESSURE: 70 MMHG | HEART RATE: 105 BPM | TEMPERATURE: 97.2 F

## 2022-05-06 DIAGNOSIS — Z23 ENCOUNTER FOR ADMINISTRATION OF VACCINE: ICD-10-CM

## 2022-05-06 DIAGNOSIS — H93.13 TINNITUS, BILATERAL: Primary | ICD-10-CM

## 2022-05-06 PROCEDURE — 90651 9VHPV VACCINE 2/3 DOSE IM: CPT | Performed by: NURSE PRACTITIONER

## 2022-05-06 PROCEDURE — 99213 OFFICE O/P EST LOW 20 MIN: CPT | Mod: 25 | Performed by: NURSE PRACTITIONER

## 2022-05-06 PROCEDURE — 90471 IMMUNIZATION ADMIN: CPT | Performed by: NURSE PRACTITIONER

## 2022-05-06 ASSESSMENT — PAIN SCALES - GENERAL: PAINLEVEL: NO PAIN (0)

## 2022-05-06 NOTE — PROGRESS NOTES
Assessment & Plan     Tinnitus, bilateral  Etiology unclear. Given symptoms are not impacting ability to do his work or significantly affecting quality of life, advised watchful waiting. Reassurance offered. Counseled on possible underlying causes including idiopathic tinnitus. He could try cutting back on caffeine intake. Referral to ENT in the event symptoms persist or worsen.   - Otolaryngology Referral; Future    Encounter for administration of vaccine  - HPV, IM (9 - 26 YRS) - Gardasil 9      20 minutes spent on the date of the encounter doing patient visit and documentation        CONSULTATION/REFERRAL to ENT.    Follow-up: Return to clinic if symptoms fail to improve, worsen, or new symptoms develop with the above treatment plan.     CHENG Wang CNP  [facility] LUISA Rashid is a 21 year old who presents for the following health issues:    Reports a one month history of intermittent ringing in both ears, R>L. More noticeable when in a quiet room. When present, ringing is constant, denies pulsatile ringing. Sometimes notices pressure in his ears. Position changes do not influence symptoms. Denies hearing loss. Denies recent URI.     Around the same time ringing in ears started, he noticed increased frequency of mild headaches. Located to right side of head. Dull ache. Pain does not usually require analgesics but has taken ibuprofen 1-2x over the past month. Denies history of migraines. Admits to increased stress at work recently, started a new role. Admits to drinking a fair amount of caffeine. Does not wear glasses or contacts although thinks he does experience eye strain and sometimes wears blue light blocking glasses.        Past Medical History:   Diagnosis Date     Arachnoid cyst     asx     Short stature, familial 2/17/2016     Current Outpatient Medications   Medication     NO ACTIVE MEDICATIONS     No current facility-administered medications for this visit.         Allergies   Allergen Reactions     Zithromax [Azithromycin] Hives       Review of Systems    ROS: 10 point ROS neg other than the symptoms noted above in the HPI.        Objective    /70   Pulse 105   Temp 97.2  F (36.2  C) (Tympanic)   Resp 16   Wt 64 kg (141 lb)   SpO2 99%   BMI 23.46 kg/m    Body mass index is 23.46 kg/m .  Physical Exam  Constitutional:       General: He is not in acute distress.     Appearance: Normal appearance. He is not ill-appearing or toxic-appearing.   HENT:      Right Ear: Tympanic membrane and ear canal normal. There is no impacted cerumen.      Left Ear: Tympanic membrane and ear canal normal. There is no impacted cerumen.   Cardiovascular:      Rate and Rhythm: Tachycardia present.   Pulmonary:      Effort: Pulmonary effort is normal. No respiratory distress.   Lymphadenopathy:      Head:      Right side of head: No preauricular or posterior auricular adenopathy.      Left side of head: No preauricular or posterior auricular adenopathy.   Neurological:      Mental Status: He is alert.

## 2022-07-13 ASSESSMENT — PATIENT HEALTH QUESTIONNAIRE - PHQ9
SUM OF ALL RESPONSES TO PHQ QUESTIONS 1-9: 1
10. IF YOU CHECKED OFF ANY PROBLEMS, HOW DIFFICULT HAVE THESE PROBLEMS MADE IT FOR YOU TO DO YOUR WORK, TAKE CARE OF THINGS AT HOME, OR GET ALONG WITH OTHER PEOPLE: NOT DIFFICULT AT ALL
SUM OF ALL RESPONSES TO PHQ QUESTIONS 1-9: 1

## 2022-07-13 ASSESSMENT — ANXIETY QUESTIONNAIRES
7. FEELING AFRAID AS IF SOMETHING AWFUL MIGHT HAPPEN: SEVERAL DAYS
GAD7 TOTAL SCORE: 3
5. BEING SO RESTLESS THAT IT IS HARD TO SIT STILL: NOT AT ALL
GAD7 TOTAL SCORE: 3
8. IF YOU CHECKED OFF ANY PROBLEMS, HOW DIFFICULT HAVE THESE MADE IT FOR YOU TO DO YOUR WORK, TAKE CARE OF THINGS AT HOME, OR GET ALONG WITH OTHER PEOPLE?: SOMEWHAT DIFFICULT
4. TROUBLE RELAXING: SEVERAL DAYS
2. NOT BEING ABLE TO STOP OR CONTROL WORRYING: NOT AT ALL
GAD7 TOTAL SCORE: 3
1. FEELING NERVOUS, ANXIOUS, OR ON EDGE: SEVERAL DAYS
3. WORRYING TOO MUCH ABOUT DIFFERENT THINGS: NOT AT ALL
6. BECOMING EASILY ANNOYED OR IRRITABLE: NOT AT ALL
7. FEELING AFRAID AS IF SOMETHING AWFUL MIGHT HAPPEN: SEVERAL DAYS

## 2022-07-14 ENCOUNTER — OFFICE VISIT (OUTPATIENT)
Dept: INTERNAL MEDICINE | Facility: CLINIC | Age: 22
End: 2022-07-14
Payer: COMMERCIAL

## 2022-07-14 VITALS
RESPIRATION RATE: 16 BRPM | TEMPERATURE: 99.1 F | WEIGHT: 142 LBS | HEIGHT: 65 IN | SYSTOLIC BLOOD PRESSURE: 112 MMHG | DIASTOLIC BLOOD PRESSURE: 78 MMHG | HEART RATE: 84 BPM | OXYGEN SATURATION: 100 % | BODY MASS INDEX: 23.66 KG/M2

## 2022-07-14 DIAGNOSIS — R11.0 NAUSEA: Primary | ICD-10-CM

## 2022-07-14 DIAGNOSIS — R41.840 DIFFICULTY CONCENTRATING: ICD-10-CM

## 2022-07-14 DIAGNOSIS — G93.0 ARACHNOID CYST: ICD-10-CM

## 2022-07-14 DIAGNOSIS — R41.89 BRAIN FOG: ICD-10-CM

## 2022-07-14 PROCEDURE — 99214 OFFICE O/P EST MOD 30 MIN: CPT | Performed by: NURSE PRACTITIONER

## 2022-07-14 ASSESSMENT — PATIENT HEALTH QUESTIONNAIRE - PHQ9
SUM OF ALL RESPONSES TO PHQ QUESTIONS 1-9: 1
10. IF YOU CHECKED OFF ANY PROBLEMS, HOW DIFFICULT HAVE THESE PROBLEMS MADE IT FOR YOU TO DO YOUR WORK, TAKE CARE OF THINGS AT HOME, OR GET ALONG WITH OTHER PEOPLE: NOT DIFFICULT AT ALL

## 2022-07-14 ASSESSMENT — ANXIETY QUESTIONNAIRES: GAD7 TOTAL SCORE: 3

## 2022-07-14 NOTE — PROGRESS NOTES
Assessment & Plan     Nausea  Discussed it seems like he is having more empty stomach and over producing acid   Suggested small meals- snacks like crackers and using tums for symptoms as needed     Brain fog  Feels he is not remembering sequences as well   feels kind of headache at times   Nothing makes better or worse   Comes and goes      Difficulty concentrating  Offered neuro psych testing - could consider ADD testing   Denies anxiety     Arachnoid cyst  As a child   Last imaging may have been around age 10   He declined imaging today         34 minutes spent on the date of the encounter doing chart review, history and exam, documentation and further activities per the note       Patient Instructions   Consider TUMS when nauseated     If symptom persist may consider an MRI       Return in about 1 year (around 7/14/2023).    CHENG Lazo CNP  [facility] SAHRON Rashid is a 21 year ol, presenting for the following health issues:    Chief Complaint   Patient presents with     Nausea     Pt states he did hit the back of his head in October of 2021.Pt c/o nausea and brain fog onset x 1 month intermittently. Pt also c/o tinnitis as well. since March.    Hit head on coffee table with sit ups- back of head   No history covid   No know trigger   Nausea comes and goes - dry gag   Brain fog at times - not quite a headache  Forgetting sequences in things he is doing   Feels like perception is off on his side vision     No seizure history in family or personality   No concerns about anxiety     Ear ringing since March 2022     Saw provider in past   No change since that time           History of Present Illness       Reason for visit:  Indent in skull  Symptom onset:  More than a month  Symptoms include:  Ringing and red ears, occasional brain fog and spurts of nausea, fatigue  Symptom intensity:  Moderate  Symptom progression:  Worsening  Had these symptoms before:  Yes  Has  "tried/received treatment for these symptoms:  No  What makes it better:  Focusing on other things    He eats 0-1 servings of fruits and vegetables daily.He consumes 0 sweetened beverage(s) daily.He exercises with enough effort to increase his heart rate 30 to 60 minutes per day.  He exercises with enough effort to increase his heart rate 3 or less days per week.   He is taking medications regularly.    Today's PHQ-9         PHQ-9 Total Score: 1    PHQ-9 Q9 Thoughts of better off dead/self-harm past 2 weeks :   Not at all    How difficult have these problems made it for you to do your work, take care of things at home, or get along with other people: Not difficult at all  Today's KENNY-7 Score: 3             Review of Systems   Constitutional, HEENT, cardiovascular, pulmonary, GI, , musculoskeletal, neuro, skin, endocrine and psych systems are negative, except as otherwise noted.      Objective    /78   Pulse 84   Temp 99.1  F (37.3  C) (Oral)   Resp 16   Ht 1.651 m (5' 5\")   Wt 64.4 kg (142 lb)   SpO2 100%   BMI 23.63 kg/m    Body mass index is 23.63 kg/m .  Physical Exam   GENERAL: healthy, alert and no distress  EYES: Eyes grossly normal to inspection, PERRL and conjunctivae and sclerae normal  HENT: ear canals and TM's normal, nose and mouth without ulcers or lesions  RESP: lungs clear to auscultation - no rales, rhonchi or wheezes  CV: regular rate and rhythm, normal S1 S2, no S3 or S4, no murmur, click or rub, no peripheral edema and peripheral pulses strong  ABDOMEN: soft, nontender, no hepatosplenomegaly, no masses and bowel sounds normal  MS: no gross musculoskeletal defects noted, no edema  NEURO: Normal strength and tone, sensory exam grossly normal, mentation intact, cranial nerves 2-12 intact, gait normal including heel/toe/tandem walking, Romberg normal and rapid alternating movements and finger to nose normal  PSYCH: mentation appears normal, affect normal/bright                    .  ..  "

## 2022-07-14 NOTE — NURSING NOTE
"Chief Complaint   Patient presents with     Nausea     Pt states he did hit the back of his head in October of 2021.Pt c/o nausea and brain fog onset x 1 month intermittently. Pt also c/o tinnitis as well. since March.          initial /78   Pulse 84   Temp 99.1  F (37.3  C) (Oral)   Resp 16   Ht 1.651 m (5' 5\")   Wt 64.4 kg (142 lb)   SpO2 100%   BMI 23.63 kg/m   Estimated body mass index is 23.63 kg/m  as calculated from the following:    Height as of this encounter: 1.651 m (5' 5\").    Weight as of this encounter: 64.4 kg (142 lb)..  bp completed using cuff size regular  LAURITA TEMPLE LPN  "

## 2022-11-09 ENCOUNTER — APPOINTMENT (OUTPATIENT)
Dept: ULTRASOUND IMAGING | Facility: CLINIC | Age: 22
End: 2022-11-09
Attending: EMERGENCY MEDICINE
Payer: COMMERCIAL

## 2022-11-09 ENCOUNTER — HOSPITAL ENCOUNTER (EMERGENCY)
Facility: CLINIC | Age: 22
Discharge: HOME | End: 2022-11-09
Attending: EMERGENCY MEDICINE | Admitting: EMERGENCY MEDICINE
Payer: COMMERCIAL

## 2022-11-09 ENCOUNTER — NURSE TRIAGE (OUTPATIENT)
Dept: NURSING | Facility: CLINIC | Age: 22
End: 2022-11-09

## 2022-11-09 ENCOUNTER — APPOINTMENT (OUTPATIENT)
Dept: CT IMAGING | Facility: CLINIC | Age: 22
End: 2022-11-09
Attending: EMERGENCY MEDICINE
Payer: COMMERCIAL

## 2022-11-09 VITALS
HEART RATE: 80 BPM | TEMPERATURE: 98.2 F | SYSTOLIC BLOOD PRESSURE: 120 MMHG | DIASTOLIC BLOOD PRESSURE: 81 MMHG | OXYGEN SATURATION: 99 % | RESPIRATION RATE: 18 BRPM

## 2022-11-09 DIAGNOSIS — U07.1 INFECTION DUE TO 2019 NOVEL CORONAVIRUS: ICD-10-CM

## 2022-11-09 DIAGNOSIS — M79.89 BILATERAL HAND SWELLING: ICD-10-CM

## 2022-11-09 LAB
ANION GAP SERPL CALCULATED.3IONS-SCNC: 13 MMOL/L (ref 7–15)
BASOPHILS # BLD AUTO: 0 10E3/UL (ref 0–0.2)
BASOPHILS NFR BLD AUTO: 0 %
BUN SERPL-MCNC: 10.2 MG/DL (ref 6–20)
CALCIUM SERPL-MCNC: 9.1 MG/DL (ref 8.6–10)
CHLORIDE SERPL-SCNC: 93 MMOL/L (ref 98–107)
CREAT SERPL-MCNC: 0.82 MG/DL (ref 0.67–1.17)
CRP SERPL-MCNC: 22.68 MG/L
D DIMER PPP FEU-MCNC: 0.56 UG/ML FEU (ref 0–0.5)
DEPRECATED HCO3 PLAS-SCNC: 25 MMOL/L (ref 22–29)
EOSINOPHIL # BLD AUTO: 0 10E3/UL (ref 0–0.7)
EOSINOPHIL NFR BLD AUTO: 0 %
ERYTHROCYTE [DISTWIDTH] IN BLOOD BY AUTOMATED COUNT: 12.9 % (ref 10–15)
GFR SERPL CREATININE-BSD FRML MDRD: >90 ML/MIN/1.73M2
GLUCOSE SERPL-MCNC: 151 MG/DL (ref 70–99)
HCT VFR BLD AUTO: 42 % (ref 40–53)
HGB BLD-MCNC: 14.2 G/DL (ref 13.3–17.7)
IMM GRANULOCYTES # BLD: 0 10E3/UL
IMM GRANULOCYTES NFR BLD: 0 %
LYMPHOCYTES # BLD AUTO: 1.7 10E3/UL (ref 0.8–5.3)
LYMPHOCYTES NFR BLD AUTO: 34 %
MCH RBC QN AUTO: 29.4 PG (ref 26.5–33)
MCHC RBC AUTO-ENTMCNC: 33.8 G/DL (ref 31.5–36.5)
MCV RBC AUTO: 87 FL (ref 78–100)
MONOCYTES # BLD AUTO: 0.7 10E3/UL (ref 0–1.3)
MONOCYTES NFR BLD AUTO: 13 %
NEUTROPHILS # BLD AUTO: 2.6 10E3/UL (ref 1.6–8.3)
NEUTROPHILS NFR BLD AUTO: 53 %
NRBC # BLD AUTO: 0 10E3/UL
NRBC BLD AUTO-RTO: 0 /100
NT-PROBNP SERPL-MCNC: 26 PG/ML (ref 0–450)
PLATELET # BLD AUTO: 195 10E3/UL (ref 150–450)
POTASSIUM SERPL-SCNC: 3.6 MMOL/L (ref 3.4–5.3)
RBC # BLD AUTO: 4.83 10E6/UL (ref 4.4–5.9)
SODIUM SERPL-SCNC: 131 MMOL/L (ref 136–145)
TROPONIN T SERPL HS-MCNC: <6 NG/L
WBC # BLD AUTO: 5 10E3/UL (ref 4–11)

## 2022-11-09 PROCEDURE — 86140 C-REACTIVE PROTEIN: CPT | Performed by: EMERGENCY MEDICINE

## 2022-11-09 PROCEDURE — 80048 BASIC METABOLIC PNL TOTAL CA: CPT | Performed by: EMERGENCY MEDICINE

## 2022-11-09 PROCEDURE — 71275 CT ANGIOGRAPHY CHEST: CPT

## 2022-11-09 PROCEDURE — 85025 COMPLETE CBC W/AUTO DIFF WBC: CPT | Performed by: EMERGENCY MEDICINE

## 2022-11-09 PROCEDURE — 84484 ASSAY OF TROPONIN QUANT: CPT | Performed by: EMERGENCY MEDICINE

## 2022-11-09 PROCEDURE — 93970 EXTREMITY STUDY: CPT

## 2022-11-09 PROCEDURE — 83880 ASSAY OF NATRIURETIC PEPTIDE: CPT | Performed by: EMERGENCY MEDICINE

## 2022-11-09 PROCEDURE — 250N000011 HC RX IP 250 OP 636: Performed by: EMERGENCY MEDICINE

## 2022-11-09 PROCEDURE — 85379 FIBRIN DEGRADATION QUANT: CPT | Performed by: EMERGENCY MEDICINE

## 2022-11-09 PROCEDURE — 99285 EMERGENCY DEPT VISIT HI MDM: CPT | Mod: 25

## 2022-11-09 PROCEDURE — 36415 COLL VENOUS BLD VENIPUNCTURE: CPT | Performed by: EMERGENCY MEDICINE

## 2022-11-09 RX ORDER — IOPAMIDOL 755 MG/ML
500 INJECTION, SOLUTION INTRAVASCULAR ONCE
Status: COMPLETED | OUTPATIENT
Start: 2022-11-09 | End: 2022-11-09

## 2022-11-09 RX ADMIN — IOPAMIDOL 58 ML: 755 INJECTION, SOLUTION INTRAVENOUS at 18:31

## 2022-11-09 ASSESSMENT — ACTIVITIES OF DAILY LIVING (ADL): ADLS_ACUITY_SCORE: 35

## 2022-11-09 ASSESSMENT — ENCOUNTER SYMPTOMS
SORE THROAT: 1
COUGH: 1
SHORTNESS OF BREATH: 0
FEVER: 1

## 2022-11-09 NOTE — ED TRIAGE NOTES
Pt here for bilateral arm swelling, pt woke up thi morning and noticed his arms were swollen, pt notes they do not hurt but feel tight. Pt tested Covid + today     Triage Assessment     Row Name 11/09/22 1527       Triage Assessment (Adult)    Airway WDL WDL       Respiratory WDL    Respiratory WDL WDL       Skin Circulation/Temperature WDL    Skin Circulation/Temperature WDL WDL       Cardiac WDL    Cardiac WDL WDL       Peripheral/Neurovascular WDL    Peripheral Neurovascular WDL WDL       Cognitive/Neuro/Behavioral WDL    Cognitive/Neuro/Behavioral WDL WDL

## 2022-11-09 NOTE — TELEPHONE ENCOUNTER
Protocol not appropriate for symptoms patient state that he tested positive for covid yesterday and has mild symptoms cough and congestion no SOB no chest pain but patient has noticed swelling in his arms and legs since yesterday he rates the swelling a 7/10 patient will go to ER to be evaluated instructed to call once in parking lot to inform covid positive number given for ce     Additional Information    Negative: SEVERE difficulty breathing (e.g., struggling for each breath, speaks in single words)    Negative: Difficult to awaken or acting confused (e.g., disoriented, slurred speech)    Negative: Bluish (or gray) lips or face now    Negative: Shock suspected (e.g., cold/pale/clammy skin, too weak to stand, low BP, rapid pulse)    Negative: Sounds like a life-threatening emergency to the triager    Negative: [1] Diagnosed or suspected COVID-19 AND [2] symptoms lasting 3 or more weeks    Negative: [1] COVID-19 exposure AND [2] no symptoms    Negative: COVID-19 vaccine reaction suspected (e.g., fever, headache, muscle aches) occurring 1 to 3 days after getting vaccine    Negative: COVID-19 vaccine, questions about    Negative: [1] Lives with someone known to have influenza (flu test positive) AND [2] flu-like symptoms (e.g., cough, runny nose, sore throat, SOB; with or without fever)    Negative: [1] Adult with possible COVID-19 symptoms AND [2] triager concerned about severity of symptoms or other causes    Negative: COVID-19 and breastfeeding, questions about    Negative: SEVERE or constant chest pain or pressure  (Exception: Mild central chest pain, present only when coughing.)    Negative: MODERATE difficulty breathing (e.g., speaks in phrases, SOB even at rest, pulse 100-120)    Negative: [1] Headache AND [2] stiff neck (can't touch chin to chest)    Negative: Oxygen level (e.g., pulse oximetry) 90 percent or lower    Negative: Chest pain or pressure    Negative: Patient sounds very sick or weak to the  triager    Negative: MILD difficulty breathing (e.g., minimal/no SOB at rest, SOB with walking, pulse <100)    Negative: Fever > 103 F (39.4 C)    Negative: [1] Fever > 101 F (38.3 C) AND [2] age > 60 years    Negative: [1] Fever > 100.0 F (37.8 C) AND [2] bedridden (e.g., nursing home patient, CVA, chronic illness, recovering from surgery)    Negative: HIGH RISK for severe COVID complications (e.g., weak immune system, age > 64 years, obesity with BMI > 25, pregnant, chronic lung disease or other chronic medical condition)  (Exception: Already seen by PCP and no new or worsening symptoms.)    Negative: [1] HIGH RISK patient AND [2] influenza is widespread in the community AND [3] ONE OR MORE respiratory symptoms: cough, sore throat, runny or stuffy nose    Negative: [1] HIGH RISK patient AND [2] influenza exposure within the last 7 days AND [3] ONE OR MORE respiratory symptoms: cough, sore throat, runny or stuffy nose    Negative: Oxygen level (e.g., pulse oximetry) 91 to 94 percent    Protocols used: CORONAVIRUS (COVID-19) DIAGNOSED OR [id number] 1.18.2022

## 2022-11-10 NOTE — DISCHARGE INSTRUCTIONS

## 2022-11-10 NOTE — ED PROVIDER NOTES
Rapid Assessment Note    History:   Patient is an otherwise healthy 22-year-old male who presents emergency department with swelling in his upper extremities.  Patient was diagnosed with COVID this morning after getting having symptoms on Monday.  Patient notes sore throat, fever and cough.  Denies any chest pain or significant difficulty breathing.  Has never had issues with swelling like this before in the past.  Is most prominent in his hands bilaterally.  He denies any rash.  Denies any history of heart failure or kidney issues.  No pain or swelling in his lower extremities.    Exam:   General: Patient is alert, awake and interactive when I enter the room  Head: The scalp, face, and head appear normal  Eyes: Conjunctivae are normal  ENT: The nose is normal, Pinnae are normal, External acoustic canals are normal  Neck: Trachea midline  CV: Pulses are normal.   Resp: No respiratory distress   Musc: Normal muscular tone, moving all extremities. Mild swelling in the upper extremities bilaterally.   Skin: No rash or lesions noted  Neuro: Speech is normal and fluent. Face is symmetric.   Psych: Normal affect.  Appropriate interactions.      Plan of Care:   US of the upper extremities, CT PE study. I evaluated the patient and developed an initial plan of care. I discussed this plan and explained that I, or one of my partners, would be returning to complete the evaluation.     Juan Manley MD   11/09/2022  [facility]      Juan Manley MD  11/09/22 6726

## 2022-11-10 NOTE — ED PROVIDER NOTES
History   Chief Complaint:  arm swelling       HPI   Rachid Umanzor is a 22 year old male who presents with arm swelling. Patient is an otherwise healthy 22-year-old male who presents emergency department with swelling in his upper extremities.  Patient was diagnosed with COVID this morning after getting having symptoms on Monday.  Patient notes sore throat, fever and cough.  Denies any chest pain or significant difficulty breathing.  Has never had issues with swelling like this before in the past.  Is most prominent in his hands bilaterally.  He denies any rash.  Denies any history of heart failure or kidney issues.  No pain or swelling in his lower extremities.    Review of Systems   Constitutional: Positive for fever.   HENT: Positive for sore throat.    Respiratory: Positive for cough. Negative for shortness of breath.    Cardiovascular: Negative for chest pain.   Musculoskeletal:        Arm swelling   Skin: Negative for rash.   All other systems reviewed and are negative.    Allergies:  Zithromax [Azithromycin]    Medications:  The patient is currently on no regular medications.    Past Medical History:     Arachnoid cyst     Past Surgical History:    orchiopexy  Left wrist repair     Family History:    Mother-Heart disease    Social History:  The patient presents to the ED with his father.    Physical Exam     Patient Vitals for the past 24 hrs:   BP Temp Temp src Pulse Resp SpO2   11/09/22 1518 (!) 136/96 98.2  F (36.8  C) Temporal 94 18 100 %       Physical Exam  General: Patient is alert, awake and interactive when I enter the room  Head: The scalp, face, and head appear normal  Eyes: Conjunctivae are normal  ENT: The nose is normal, Pinnae are normal, External acoustic canals are normal  Neck: Trachea midline  CV: Pulses are normal.   Resp: No respiratory distress   Musc: Normal muscular tone, moving all extremities. Mild swelling in the upper extremities bilaterally.   Skin: No rash or lesions  noted  Neuro: Speech is normal and fluent. Face is symmetric.   Psych: Normal affect.  Appropriate interactions.    Emergency Department Course     Imaging:  US Upper Extremity Venous Duplex Bilat   Final Result   IMPRESSION:   1.  No deep venous thrombosis in the bilateral upper extremities.      CT Chest Pulmonary Embolism w Contrast   Final Result   IMPRESSION:   1.  No evidence for pulmonary embolism or other acute abnormality in the chest.        Report per radiology    Laboratory:  Labs Ordered and Resulted from Time of ED Arrival to Time of ED Departure   D DIMER QUANTITATIVE - Abnormal       Result Value    D-Dimer Quantitative 0.56 (*)    BASIC METABOLIC PANEL - Abnormal    Sodium 131 (*)     Potassium 3.6      Chloride 93 (*)     Carbon Dioxide (CO2) 25      Anion Gap 13      Urea Nitrogen 10.2      Creatinine 0.82      Calcium 9.1      Glucose 151 (*)     GFR Estimate >90     CRP INFLAMMATION - Abnormal    CRP Inflammation 22.68 (*)    TROPONIN T, HIGH SENSITIVITY - Normal    Troponin T, High Sensitivity <6     NT PROBNP INPATIENT - Normal    N terminal Pro BNP Inpatient 26     CBC WITH PLATELETS AND DIFFERENTIAL    WBC Count 5.0      RBC Count 4.83      Hemoglobin 14.2      Hematocrit 42.0      MCV 87      MCH 29.4      MCHC 33.8      RDW 12.9      Platelet Count 195      % Neutrophils 53      % Lymphocytes 34      % Monocytes 13      % Eosinophils 0      % Basophils 0      % Immature Granulocytes 0      NRBCs per 100 WBC 0      Absolute Neutrophils 2.6      Absolute Lymphocytes 1.7      Absolute Monocytes 0.7      Absolute Eosinophils 0.0      Absolute Basophils 0.0      Absolute Immature Granulocytes 0.0      Absolute NRBCs 0.0        Emergency Department Course:     Reviewed:  I reviewed nursing notes, vitals, past medical history and Care Everywhere    Assessments:  2035 I obtained history and examined the patient as noted above.     Disposition:  The patient was discharged to home.     Impression &  Plan     Medical Decision Making:  Patient is an otherwise healthy 22-year-old male who was recently diagnosed with COVID who presents emergency department bilateral swelling in his arms.  Concern for possible central occlusive pathology therefore CT scan was obtained.  Thankfully this was negative for any signs of mass or PE.  Furthermore bilateral upper extremity ultrasounds were negative for any evidence of clot burden.  Suspect the swelling may be due to his COVID infection.  The remainder of his work-up is reassuring.  Can treat conservatively at this time as he is not have any additional signs or symptoms of severe COVID including respiratory compromise.  This juncture I feel comfortable discharging him home and he can return the emergency department any new or worsening symptoms.    Diagnosis:    ICD-10-CM    1. Infection due to 2019 novel coronavirus  U07.1       2. Bilateral hand swelling  M79.89         Scribe Disclosure:  I, Ede Mcdaniel, am serving as a scribe at 8:34 PM on 11/9/2022 to document services personally performed by Juan Manley MD based on my observations and the provider's statements to me.          Juan Manley MD  11/10/22 1374

## 2022-12-26 ENCOUNTER — HEALTH MAINTENANCE LETTER (OUTPATIENT)
Age: 22
End: 2022-12-26

## 2023-04-22 ENCOUNTER — HEALTH MAINTENANCE LETTER (OUTPATIENT)
Age: 23
End: 2023-04-22

## 2023-06-11 ASSESSMENT — ENCOUNTER SYMPTOMS
NERVOUS/ANXIOUS: 0
SHORTNESS OF BREATH: 0
ARTHRALGIAS: 1
FREQUENCY: 0
WEAKNESS: 0
DIARRHEA: 0
COUGH: 0
HEARTBURN: 0
CHILLS: 0
CONSTIPATION: 0
EYE PAIN: 0
DYSURIA: 0
MYALGIAS: 0
JOINT SWELLING: 0
HEMATURIA: 0
PARESTHESIAS: 0
NAUSEA: 0
SORE THROAT: 0
FEVER: 0
HEADACHES: 0
PALPITATIONS: 0
ABDOMINAL PAIN: 0
DIZZINESS: 0
HEMATOCHEZIA: 0

## 2023-06-14 ENCOUNTER — OFFICE VISIT (OUTPATIENT)
Dept: INTERNAL MEDICINE | Facility: CLINIC | Age: 23
End: 2023-06-14
Payer: COMMERCIAL

## 2023-06-14 VITALS
RESPIRATION RATE: 12 BRPM | HEART RATE: 98 BPM | HEIGHT: 64 IN | WEIGHT: 140.9 LBS | DIASTOLIC BLOOD PRESSURE: 71 MMHG | BODY MASS INDEX: 24.05 KG/M2 | TEMPERATURE: 98.6 F | SYSTOLIC BLOOD PRESSURE: 124 MMHG | OXYGEN SATURATION: 100 %

## 2023-06-14 DIAGNOSIS — Z00.00 ROUTINE GENERAL MEDICAL EXAMINATION AT A HEALTH CARE FACILITY: Primary | ICD-10-CM

## 2023-06-14 DIAGNOSIS — Z11.4 SCREENING FOR HIV (HUMAN IMMUNODEFICIENCY VIRUS): ICD-10-CM

## 2023-06-14 DIAGNOSIS — Z11.59 NEED FOR HEPATITIS C SCREENING TEST: ICD-10-CM

## 2023-06-14 DIAGNOSIS — Z13.220 LIPID SCREENING: ICD-10-CM

## 2023-06-14 PROCEDURE — 90651 9VHPV VACCINE 2/3 DOSE IM: CPT

## 2023-06-14 PROCEDURE — 87389 HIV-1 AG W/HIV-1&-2 AB AG IA: CPT

## 2023-06-14 PROCEDURE — 99395 PREV VISIT EST AGE 18-39: CPT | Mod: 25

## 2023-06-14 PROCEDURE — 86803 HEPATITIS C AB TEST: CPT

## 2023-06-14 PROCEDURE — 36415 COLL VENOUS BLD VENIPUNCTURE: CPT

## 2023-06-14 PROCEDURE — 90471 IMMUNIZATION ADMIN: CPT

## 2023-06-14 PROCEDURE — 80053 COMPREHEN METABOLIC PANEL: CPT

## 2023-06-14 PROCEDURE — 80061 LIPID PANEL: CPT

## 2023-06-14 ASSESSMENT — ENCOUNTER SYMPTOMS
FREQUENCY: 0
FEVER: 0
ABDOMINAL PAIN: 0
CONSTIPATION: 0
HEADACHES: 0
NAUSEA: 0
WEAKNESS: 0
HEMATOCHEZIA: 0
PARESTHESIAS: 0
SHORTNESS OF BREATH: 0
PALPITATIONS: 0
DYSURIA: 0
CHILLS: 0
NERVOUS/ANXIOUS: 0
DIARRHEA: 0
HEARTBURN: 0
HEMATURIA: 0
MYALGIAS: 0
ARTHRALGIAS: 1
COUGH: 0
SORE THROAT: 0
JOINT SWELLING: 0
DIZZINESS: 0
EYE PAIN: 0

## 2023-06-14 NOTE — PROGRESS NOTES
SUBJECTIVE:   CC: Rachid is an 22 year old who presents for preventative health visit.       6/14/2023     3:31 PM   Additional Questions   Roomed by Vero Alvarenga MA   Accompanied by Himself     Healthy Habits:     Getting at least 3 servings of Calcium per day:  Yes    Bi-annual eye exam:  Yes    Dental care twice a year:  NO    Sleep apnea or symptoms of sleep apnea:  None    Diet:  Regular (no restrictions)    Frequency of exercise:  4-5 days/week    Duration of exercise:  30-45 minutes    Taking medications regularly:  Not Applicable    Medication side effects:  Not applicable    PHQ-2 Total Score: 0      Today's PHQ-2 Score:       6/14/2023     3:22 PM   PHQ-2 ( 1999 Pfizer)   Q1: Little interest or pleasure in doing things 0   Q2: Feeling down, depressed or hopeless 0   PHQ-2 Score 0   Q1: Little interest or pleasure in doing things Not at all   Q2: Feeling down, depressed or hopeless Not at all   PHQ-2 Score 0       Have you ever done Advance Care Planning? (For example, a Health Directive, POLST, or a discussion with a medical provider or your loved ones about your wishes): No, advance care planning information given to patient to review.  Advanced care planning was discussed at today's visit.    Social History     Tobacco Use     Smoking status: Never     Smokeless tobacco: Never   Vaping Use     Vaping status: Never Used   Substance Use Topics     Alcohol use: Yes           6/11/2023     1:05 PM   Alcohol Use   Prescreen: >3 drinks/day or >7 drinks/week? No          View : No data to display.                Last PSA: No results found for: PSA    Reviewed orders with patient. Reviewed health maintenance and updated orders accordingly - Yes  Lab work is in process    Reviewed and updated as needed this visit by clinical staff   Tobacco  Allergies  Meds              Reviewed and updated as needed this visit by Provider                 Past Medical History:   Diagnosis Date     Arachnoid cyst     asx      "Short stature, familial 02/17/2016      Past Surgical History:   Procedure Laterality Date     ORCHIOPEXY Bilateral 10/11/2020    Procedure: SCROTAL EXPLORATION, RIGHT TESTICULAR DETORSION, BILATERAL SCROTAL ORCHIOPEXY;  Surgeon: Junior Shaw MD;  Location: [facility] OR     ORTHOPEDIC SURGERY  08/28/2021    left wrist repair       Review of Systems   Constitutional: Negative for chills and fever.   HENT: Negative for congestion, ear pain, hearing loss and sore throat.    Eyes: Negative for pain and visual disturbance.   Respiratory: Negative for cough and shortness of breath.    Cardiovascular: Negative for chest pain, palpitations and peripheral edema.   Gastrointestinal: Negative for abdominal pain, constipation, diarrhea, heartburn, hematochezia and nausea.   Genitourinary: Negative for dysuria, frequency, genital sores, hematuria, impotence, penile discharge and urgency.   Musculoskeletal: Positive for arthralgias. Negative for joint swelling and myalgias.   Skin: Negative for rash.   Neurological: Negative for dizziness, weakness, headaches and paresthesias.   Psychiatric/Behavioral: Negative for mood changes. The patient is not nervous/anxious.      Feels that he twisted knee a few weeks ago, denies pop or snap during this time. Is able to walk on it, does not cause him pain    Works as a     OBJECTIVE:   /71 (BP Location: Right arm, Patient Position: Sitting, Cuff Size: Adult Regular)   Pulse 98   Temp 98.6  F (37  C) (Oral)   Resp 12   Ht 1.619 m (5' 3.75\")   Wt 63.9 kg (140 lb 14.4 oz)   SpO2 100%   BMI 24.38 kg/m      Physical Exam  GENERAL: alert and no distress  EYES: Eyes grossly normal to inspection  HENT: ear canals and TM's normal, nose and mouth without ulcers or lesions  NECK: no adenopathy, no asymmetry, masses, or scars and thyroid normal to palpation  RESP: lungs clear to auscultation - no rales, rhonchi or wheezes  CV: regular rate and rhythm, normal S1 S2, no S3 or " S4, no murmur, click or rub, no peripheral edema and peripheral pulses strong  ABDOMEN: soft, nontender, no hepatosplenomegaly, no masses and bowel sounds normal  MS: no gross musculoskeletal defects noted, no edema  SKIN: no suspicious lesions or rashes  NEURO: Normal strength and tone, mentation intact and speech normal  PSYCH: mentation appears normal, affect normal/bright    Diagnostic Test Results:  Labs reviewed in Epic    ASSESSMENT/PLAN:   (Z00.00) Routine general medical examination at a health care facility  (primary encounter diagnosis)  Comment: routine- patient has no concerns and is feeling well  Plan: Comprehensive metabolic panel (BMP + Alb, Alk         Phos, ALT, AST, Total. Bili, TP)            (Z11.4) Screening for HIV (human immunodeficiency virus)  Comment:   Plan: HIV Antigen Antibody Combo            (Z11.59) Need for hepatitis C screening test  Comment:   Plan: Hepatitis C Screen Reflex to HCV RNA Quant and         Genotype            (Z13.220) Lipid screening  Comment:   Plan: Lipid panel reflex to direct LDL Non-fasting              COUNSELING:   Reviewed preventive health counseling, as reflected in patient instructions      He reports that he has never smoked. He has never used smokeless tobacco.      Jesus Bergman NP  [facility]

## 2023-06-15 LAB
ALBUMIN SERPL BCG-MCNC: 5.3 G/DL (ref 3.5–5.2)
ALP SERPL-CCNC: 47 U/L (ref 40–129)
ALT SERPL W P-5'-P-CCNC: 13 U/L (ref 0–70)
ANION GAP SERPL CALCULATED.3IONS-SCNC: 16 MMOL/L (ref 7–15)
AST SERPL W P-5'-P-CCNC: 26 U/L (ref 0–45)
BILIRUB SERPL-MCNC: 0.4 MG/DL
BUN SERPL-MCNC: 11.2 MG/DL (ref 6–20)
CALCIUM SERPL-MCNC: 10.3 MG/DL (ref 8.6–10)
CHLORIDE SERPL-SCNC: 95 MMOL/L (ref 98–107)
CHOLEST SERPL-MCNC: 181 MG/DL
CREAT SERPL-MCNC: 0.96 MG/DL (ref 0.67–1.17)
DEPRECATED HCO3 PLAS-SCNC: 24 MMOL/L (ref 22–29)
GFR SERPL CREATININE-BSD FRML MDRD: >90 ML/MIN/1.73M2
GLUCOSE SERPL-MCNC: 96 MG/DL (ref 70–99)
HCV AB SERPL QL IA: NONREACTIVE
HDLC SERPL-MCNC: 92 MG/DL
HIV 1+2 AB+HIV1 P24 AG SERPL QL IA: NONREACTIVE
LDLC SERPL CALC-MCNC: 81 MG/DL
NONHDLC SERPL-MCNC: 89 MG/DL
POTASSIUM SERPL-SCNC: 3.7 MMOL/L (ref 3.4–5.3)
PROT SERPL-MCNC: 7.9 G/DL (ref 6.4–8.3)
SODIUM SERPL-SCNC: 135 MMOL/L (ref 136–145)
TRIGL SERPL-MCNC: 42 MG/DL

## 2023-06-16 DIAGNOSIS — E83.52 HIGH CALCIUM LEVELS: Primary | ICD-10-CM

## 2023-06-30 ENCOUNTER — LAB (OUTPATIENT)
Dept: LAB | Facility: CLINIC | Age: 23
End: 2023-06-30
Payer: COMMERCIAL

## 2023-06-30 DIAGNOSIS — E83.52 HIGH CALCIUM LEVELS: ICD-10-CM

## 2023-06-30 LAB
CALCIUM SERPL-MCNC: 9.9 MG/DL (ref 8.6–10)
PTH-INTACT SERPL-MCNC: 26 PG/ML (ref 15–65)

## 2023-06-30 PROCEDURE — 83970 ASSAY OF PARATHORMONE: CPT

## 2023-06-30 PROCEDURE — 36415 COLL VENOUS BLD VENIPUNCTURE: CPT

## 2023-06-30 PROCEDURE — 82306 VITAMIN D 25 HYDROXY: CPT

## 2023-06-30 PROCEDURE — 82310 ASSAY OF CALCIUM: CPT

## 2023-07-03 LAB — DEPRECATED CALCIDIOL+CALCIFEROL SERPL-MC: 31 UG/L (ref 20–75)

## 2023-12-26 ENCOUNTER — E-VISIT (OUTPATIENT)
Dept: URGENT CARE | Facility: CLINIC | Age: 23
End: 2023-12-26
Payer: COMMERCIAL

## 2023-12-26 DIAGNOSIS — R30.0 DIFFICULT OR PAINFUL URINATION: Primary | ICD-10-CM

## 2023-12-26 PROCEDURE — 99421 OL DIG E/M SVC 5-10 MIN: CPT | Performed by: NURSE PRACTITIONER

## 2023-12-27 ENCOUNTER — LAB (OUTPATIENT)
Dept: LAB | Facility: CLINIC | Age: 23
End: 2023-12-27
Payer: COMMERCIAL

## 2023-12-27 DIAGNOSIS — R30.0 DIFFICULT OR PAINFUL URINATION: ICD-10-CM

## 2023-12-27 LAB
ALBUMIN UR-MCNC: NEGATIVE MG/DL
AMORPH CRY #/AREA URNS HPF: ABNORMAL /HPF
APPEARANCE UR: ABNORMAL
BILIRUB UR QL STRIP: NEGATIVE
COLOR UR AUTO: YELLOW
GLUCOSE UR STRIP-MCNC: NEGATIVE MG/DL
HGB UR QL STRIP: NEGATIVE
KETONES UR STRIP-MCNC: NEGATIVE MG/DL
LEUKOCYTE ESTERASE UR QL STRIP: NEGATIVE
NITRATE UR QL: NEGATIVE
PH UR STRIP: 7 [PH] (ref 5–7)
RBC #/AREA URNS AUTO: ABNORMAL /HPF
SP GR UR STRIP: 1.02 (ref 1–1.03)
SQUAMOUS #/AREA URNS AUTO: ABNORMAL /LPF
UROBILINOGEN UR STRIP-ACNC: 0.2 E.U./DL
WBC #/AREA URNS AUTO: ABNORMAL /HPF

## 2023-12-27 PROCEDURE — 81001 URINALYSIS AUTO W/SCOPE: CPT

## 2023-12-27 PROCEDURE — 87491 CHLMYD TRACH DNA AMP PROBE: CPT

## 2023-12-27 PROCEDURE — 87591 N.GONORRHOEAE DNA AMP PROB: CPT

## 2023-12-27 NOTE — PATIENT INSTRUCTIONS
Dear Rachid Umanzor,     After reviewing your responses, I would like you to come in for a urine test to make sure we treat you correctly. This urine test is to evaluate you for a possible urinary tract infection, and should be scheduled for today or tomorrow. Schedule a Lab Only appointment here.     Lab appointments are not available at most locations on the weekends. If no Lab Only appointment is available, you should be seen in any of our convenient Walk-in or Urgent Care Centers, which can be found on our website here.     You will receive instructions with your results in [employer] once they are available.     If your symptoms worsen, you develop pain in your back or stomach, develop fevers, or are not improving in 5 days, please contact your primary care provider for an appointment or visit a Walk-in or Urgent Care Center to be seen.     Thanks again for choosing us as your health care partner,     Mary Anne Cancino, CNP

## 2023-12-28 LAB
C TRACH DNA SPEC QL NAA+PROBE: NEGATIVE
N GONORRHOEA DNA SPEC QL NAA+PROBE: NEGATIVE

## 2024-03-02 ENCOUNTER — E-VISIT (OUTPATIENT)
Dept: INTERNAL MEDICINE | Facility: CLINIC | Age: 24
End: 2024-03-02
Payer: COMMERCIAL

## 2024-03-02 DIAGNOSIS — K14.3 TONGUE COATING: Primary | ICD-10-CM

## 2024-03-02 PROCEDURE — 99207 PR NON-BILLABLE SERV PER CHARTING: CPT | Performed by: NURSE PRACTITIONER

## 2024-03-04 NOTE — PATIENT INSTRUCTIONS
Thank you for choosing us for your care. I think an in-clinic visit would be best next steps based on your symptoms. Please schedule a clinic appointment; you won t be charged for this eVisit.      We have a same day provider you could see so we can confirm what we are treating. Teresa Watts NP    You can schedule an appointment right here in [facility], or call [phone]

## 2024-04-17 ENCOUNTER — OFFICE VISIT (OUTPATIENT)
Dept: INTERNAL MEDICINE | Facility: CLINIC | Age: 24
End: 2024-04-17
Payer: COMMERCIAL

## 2024-04-17 VITALS
OXYGEN SATURATION: 99 % | HEIGHT: 64 IN | HEART RATE: 86 BPM | RESPIRATION RATE: 14 BRPM | BODY MASS INDEX: 23.8 KG/M2 | WEIGHT: 139.4 LBS | DIASTOLIC BLOOD PRESSURE: 86 MMHG | SYSTOLIC BLOOD PRESSURE: 128 MMHG

## 2024-04-17 DIAGNOSIS — B37.0 THRUSH: Primary | ICD-10-CM

## 2024-04-17 DIAGNOSIS — R03.0 ELEVATED BLOOD PRESSURE READING WITHOUT DIAGNOSIS OF HYPERTENSION: ICD-10-CM

## 2024-04-17 PROCEDURE — 99213 OFFICE O/P EST LOW 20 MIN: CPT | Performed by: INTERNAL MEDICINE

## 2024-04-17 RX ORDER — FLUCONAZOLE 100 MG/1
TABLET ORAL
Qty: 8 TABLET | Refills: 0 | Status: SHIPPED | OUTPATIENT
Start: 2024-04-17 | End: 2024-04-24

## 2024-04-17 NOTE — PROGRESS NOTES
"Assessment & Plan   Thrush  Symptoms improved but did not fully go away with nystatin therapy. Will trial PO fluconazole.  - fluconazole (DIFLUCAN) 100 MG tablet; Take 2 tablets (200 mg) by mouth daily for 1 day, THEN 1 tablet (100 mg) daily for 6 days.    Elevated blood pressure reading without diagnosis of hypertension  BP at goal today. If patient is concerned, I encouraged him to start checking BP at home and to let primary team know via f/u visit what those readings are in case further action is needed.    F/u with regular PCP at regular interval or sooner PRN    Signed Electronically by:  Luis Altamirano MD, MPH  [facility]  Internal Medicine    Subjective   Rachid is a 23 year old presenting for the following health issues:  Hypertension and Mouth Problem  This is the first time I have met Rachid.    History of Present Illness       Hypertension: He presents for follow up of hypertension.  He does not check blood pressure  regularly outside of the clinic. Outpatient blood pressures have not been over 140/90. He does not follow a low salt diet.     Reason for visit:  Follow up for potentially high blood pressure and oral thrush    He eats 2-3 servings of fruits and vegetables daily.He consumes 0 sweetened beverage(s) daily.He exercises with enough effort to increase his heart rate 30 to 60 minutes per day.  He exercises with enough effort to increase his heart rate 5 days per week.   He is taking medications regularly.     Treated at [facility] recently for thrush with nystatin. BP at that visit was 130s/90s. Denies swallowing difficulties.        Objective    /86   Pulse 86   Resp 14   Ht 1.619 m (5' 3.75\")   Wt 63.2 kg (139 lb 6.4 oz)   SpO2 99%   BMI 24.12 kg/m    Body mass index is 24.12 kg/m .    Physical Exam   GENERAL: alert and in no distress.  EYES: conjunctivae/corneas clear. EOMs grossly intact  HENT: Facies symmetric. Thin white film on back of tongue " easily removable with tongue depressor.  RESP: CTAB, no w/r/r.  CV: RRR, no m/r/g.  MSK: Moves all four extremities freely.  SKIN: No significant ulcers, lesions, or rashes on the visualized portions of the skin  NEURO: CN II-XII grossly intact.

## 2024-04-17 NOTE — PATIENT INSTRUCTIONS
Check your blood pressure at home! You can buy a home monitor in a drugstore, supermarket pharmacy, or other large store. Not all automated blood pressure machines are created equal. You can find a list of validated blood pressure cuffs (meaning they have been confirmed to give accurate readings) by going to www.validatebp.org. That website does not sell blood pressure cuffs, but rather it lists the exact models that have been validated to be accurate. Wrist blood pressure cuffs do not provide reliable comparisons to upper arm (brachial) cuffs. Be sure the arm cuff is the right size for your arm. Ask someone to measure around your upper arm. If your upper arm is more than 13 inches around, buy a monitor with a large cuff. To get a correct measurement, the cuff needs to be the right size.    It is important to measure your blood pressure periodically at home in between office visits. The readings you obtain during these blood pressure checks are often more valuable than the readings we obtain in clinic. However, it is even more important to check your blood pressure CORRECTLY at home. Follow these tips.    Check your blood pressure in the early morning (before you eat, drink, or take any medicines) and at one other random time of day. The random time of day does not need to be consistent from day to day.  Put the cuff on your arm. Remove clothes that get in the way of the cuff. Don t roll up your sleeve in a way that s tight around your arm. The cord should go toward your hand. Line it up with the middle of your forearm. The Velcro should attach easily on the cuff. If it doesn t reach, you may need a bigger cuff.  Wait 30 minutes if you have just eaten a lot, had a drink with caffeine or alcohol, used tobacco products, or exercised. Use the restroom if you need to. (Needing to go can raise your BP.)  Rest both feet flat on the floor with your back supported. Rest your arm at heart level on a table or the arm of a  chair.  Sit quietly for 5 minutes or more before taking your blood pressure. Avoid talking while your blood pressure is being measured.  Start the monitor. Press the button or squeeze the ball to measure your blood pressure. Write down the time, the measurement, and your pulse.  Wait 2 minutes.  Repeat 2 more times.  Take the average of the readings. That's your blood pressure.    Lastly, bring your home monitor into the office for us to validate! Compare your blood pressure monitor to the standardized method we use. It's a good idea to do this once per machine or if your machine starts giving you odd readings (suddenly much higher or lower than you're used to).

## 2024-04-30 ENCOUNTER — MYC MEDICAL ADVICE (OUTPATIENT)
Dept: INTERNAL MEDICINE | Facility: CLINIC | Age: 24
End: 2024-04-30
Payer: COMMERCIAL

## 2024-04-30 DIAGNOSIS — K14.3 TONGUE COATING: Primary | ICD-10-CM

## 2024-05-06 ENCOUNTER — TELEPHONE (OUTPATIENT)
Dept: OTOLARYNGOLOGY | Facility: CLINIC | Age: 24
End: 2024-05-06
Payer: COMMERCIAL

## 2024-05-06 NOTE — TELEPHONE ENCOUNTER
[facility] Call Center    Phone Message    May a detailed message be left on voicemail: yes     Reason for Call: Other: Pt calling to schedule ref for tongue coating. 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 [facility] location. Please call to schedule. Thanks.     Action Taken: Other: ENT    Travel Screening: Not Applicable

## 2024-05-08 NOTE — TELEPHONE ENCOUNTER
"FUTURE VISIT INFORMATION      FUTURE VISIT INFORMATION:  Date: 7/30/24  Time: 10:55 AM  [place] [facility] [place]  REFERRAL INFORMATION:  Referring provider:  Luis Malone MD.  Referring providers clinic:  [facility]  Reason for visit/diagnosis:  Tongue coating [K14.3]. Persistent tongue symptoms. Ref by Luis Malone MD. [facility] verified     RECORDS REQUESTED FROM      Clinic name Comments Records Status Imaging Status   [facility] 4/17/24 note- Luis Malone MD. Epic            \"Please notify/message CSS if patient completed outside imaging prior to scheduled appointment and/or any outside records that might have been missed at pre visit -Thank you\"  "

## 2024-06-19 ENCOUNTER — TELEPHONE (OUTPATIENT)
Dept: OTOLARYNGOLOGY | Facility: CLINIC | Age: 24
End: 2024-06-19
Payer: COMMERCIAL

## 2024-06-19 NOTE — TELEPHONE ENCOUNTER
Patient confirmed scheduled appointment:  Date: 8/6  Time: 2:20pm  Visit type: New ENT   Provider: Dr. Gonzalez  Location: [facility]  Testing/imaging:   Additional notes:

## 2024-07-30 ENCOUNTER — PRE VISIT (OUTPATIENT)
Dept: OTOLARYNGOLOGY | Facility: CLINIC | Age: 24
End: 2024-07-30

## 2024-08-06 ENCOUNTER — OFFICE VISIT (OUTPATIENT)
Dept: OTOLARYNGOLOGY | Facility: CLINIC | Age: 24
End: 2024-08-06
Attending: INTERNAL MEDICINE
Payer: COMMERCIAL

## 2024-08-06 ENCOUNTER — TELEPHONE (OUTPATIENT)
Dept: OTOLARYNGOLOGY | Facility: CLINIC | Age: 24
End: 2024-08-06

## 2024-08-06 VITALS
HEIGHT: 64 IN | DIASTOLIC BLOOD PRESSURE: 102 MMHG | HEART RATE: 111 BPM | WEIGHT: 136.1 LBS | OXYGEN SATURATION: 100 % | SYSTOLIC BLOOD PRESSURE: 142 MMHG | BODY MASS INDEX: 23.23 KG/M2

## 2024-08-06 DIAGNOSIS — K14.3 TONGUE COATING: ICD-10-CM

## 2024-08-06 PROCEDURE — 99203 OFFICE O/P NEW LOW 30 MIN: CPT | Performed by: OTOLARYNGOLOGY

## 2024-08-06 ASSESSMENT — PAIN SCALES - GENERAL: PAINLEVEL: NO PAIN (0)

## 2024-08-06 NOTE — LETTER
8/6/2024       RE: Rachid Umanzor  [id number] [address] [id number]     Dear Colleague,    Thank you for referring your patient, Rachid Umanzor, to the [facility] E[facility] [place] at [facility]. Please see a copy of my visit note below.    HISTORY OF PRESENT ILLNESS: Rachid Umanzor is a 23 year old male with a history of 1 to 2 years of some dorsal tongue coating in the central part of tongue more towards the area of the circumvallate papillae there is no pain he does use tongue scraper times he will use alcohol containing mouthwash gives him no problems was treated for fungus and thrush that did not do any good    Last 2 Scores for Patient-Answered VHI Questionnaire       No data to display                Last 2 Scores for Patient-Answered CSI Questionnaire       No data to display                  Last 2 Scores for Patient-Answered EAT Questionnaire       No data to display                    PAST MEDICAL HISTORY:   Past Medical History:   Diagnosis Date     Arachnoid cyst     asx     Short stature, familial 02/17/2016       PAST SURGICAL HISTORY:   Past Surgical History:   Procedure Laterality Date     ORCHIOPEXY Bilateral 10/11/2020    Procedure: SCROTAL EXPLORATION, RIGHT TESTICULAR DETORSION, BILATERAL SCROTAL ORCHIOPEXY;  Surgeon: Junior Shaw MD;  Location: [facility] OR     ORTHOPEDIC SURGERY  08/28/2021    left wrist repair     VASCULAR SURGERY         FAMILY HISTORY:   Family History   Problem Relation Age of Onset     Heart Disease Mother      No Known Problems Father      Diabetes No family hx of      Cancer No family hx of      Colon Cancer No family hx of        SOCIAL HISTORY:   Social History     Tobacco Use     Smoking status: Never     Smokeless tobacco: Never   Substance Use Topics     Alcohol use: Yes     Comment: 4-7 drinks per week       REVIEW OF SYSTEMS: Ten point review of systems was performed and is negative  except for:        No data to display                 ALLERGIES: Zithromax [azithromycin]    MEDICATIONS:   No current outpatient medications on file.         PHYSICAL EXAMINATION:  He  is awake, alert and in no apparent distress.    His tympanic membranes are clear and intact bilaterally. External auditory canals are clear.  Nasal exam shows a mild septal deviation without obstruction.  Examination of the oral cavity shows no suspicious lesions.  There is symmetric movement of the tongue and soft palate.    The oropharynx is clear.  His neck is supple without significant adenopathy.  Pulse is regular.  Upper airway is clear.  Cranial nerves II-XII are grossly intact.   There is some hyperkeratosis of the dorsum of the tongue in the middle the other abnormalities are otherwise noted       IMPRESSION/PLAN: Patient with a history of some hyperkeratosis and dorsum of tongue this is most likely mechanically induced or induced by mouthwash I told him that he can use Toothette's he had some questions about HPV vaccination and we talked about that as well and we said that we would follow him up again in his as-needed basis               Again, thank you for allowing me to participate in the care of your patient.      Sincerely,    Kyle Gonzalez MD

## 2024-08-06 NOTE — PROGRESS NOTES
HISTORY OF PRESENT ILLNESS: Rachid Umanzor is a 23 year old male with a history of 1 to 2 years of some dorsal tongue coating in the central part of tongue more towards the area of the circumvallate papillae there is no pain he does use tongue scraper times he will use alcohol containing mouthwash gives him no problems was treated for fungus and thrush that did not do any good    Last 2 Scores for Patient-Answered VHI Questionnaire       No data to display                Last 2 Scores for Patient-Answered CSI Questionnaire       No data to display                  Last 2 Scores for Patient-Answered EAT Questionnaire       No data to display                    PAST MEDICAL HISTORY:   Past Medical History:   Diagnosis Date    Arachnoid cyst     asx    Short stature, familial 02/17/2016       PAST SURGICAL HISTORY:   Past Surgical History:   Procedure Laterality Date    ORCHIOPEXY Bilateral 10/11/2020    Procedure: SCROTAL EXPLORATION, RIGHT TESTICULAR DETORSION, BILATERAL SCROTAL ORCHIOPEXY;  Surgeon: Junior Shaw MD;  Location: [facility] OR    ORTHOPEDIC SURGERY  08/28/2021    left wrist repair    VASCULAR SURGERY         FAMILY HISTORY:   Family History   Problem Relation Age of Onset    Heart Disease Mother     No Known Problems Father     Diabetes No family hx of     Cancer No family hx of     Colon Cancer No family hx of        SOCIAL HISTORY:   Social History     Tobacco Use    Smoking status: Never    Smokeless tobacco: Never   Substance Use Topics    Alcohol use: Yes     Comment: 4-7 drinks per week       REVIEW OF SYSTEMS: Ten point review of systems was performed and is negative except for:        No data to display                 ALLERGIES: Zithromax [azithromycin]    MEDICATIONS:   No current outpatient medications on file.         PHYSICAL EXAMINATION:  He  is awake, alert and in no apparent distress.    His tympanic membranes are clear and intact bilaterally. External auditory canals are clear.  Nasal exam  shows a mild septal deviation without obstruction.  Examination of the oral cavity shows no suspicious lesions.  There is symmetric movement of the tongue and soft palate.    The oropharynx is clear.  His neck is supple without significant adenopathy.  Pulse is regular.  Upper airway is clear.  Cranial nerves II-XII are grossly intact.   There is some hyperkeratosis of the dorsum of the tongue in the middle the other abnormalities are otherwise noted       IMPRESSION/PLAN: Patient with a history of some hyperkeratosis and dorsum of tongue this is most likely mechanically induced or induced by mouthwash I told him that he can use Toothette's he had some questions about HPV vaccination and we talked about that as well and we said that we would follow him up again in his as-needed basis            10

## 2024-08-06 NOTE — PATIENT INSTRUCTIONS
You were seen in the ENT Clinic today by Dr. Gonzalez. If you have any questions or concerns after your appointment, please contact us (see below)       2.   The following recommendations have been made based upon your appointment today:   -Consider biotene toothpaste or mouthwash.  -Toothettes for manual abrasion      3.   Plan to return to the ENT clinic in 3 months with a phone visit           How to Contact Us:  Send a [employer] message to your provider. Our team will respond to you via [employer]. Occasionally, we will need to call you to get further information.  For urgent matters (Monday-Friday), call the ENT Clinic: [phone] and speak with a call center team member - they will route your call appropriately.   If you'd like to speak directly with a nurse, please find our contact information below. We do our best to check voicemail frequently throughout the day, and will work to call you back within 1-2 days. For urgent matters, please use the general clinic phone numbers listed above.     ROGER Monique  Direct: [phone]  Darleen CENTENO LPN  Direct: [phone]         [facility]  Department of Otolaryngology

## 2024-08-06 NOTE — TELEPHONE ENCOUNTER
Patient confirmed scheduled appointment:  Date: 11/5/24  Time: 3:20pm  Visit type: Return ENT Telephone   Provider: Dr. Gonzalez  Location: [facility]  Testing/imaging:   Additional notes:

## 2024-09-01 ENCOUNTER — HEALTH MAINTENANCE LETTER (OUTPATIENT)
Age: 24
End: 2024-09-01

## (undated) DEVICE — ESU GROUND PAD ADULT W/CORD E7507

## (undated) DEVICE — SYR EAR BULB 3OZ 0035830

## (undated) DEVICE — SU CHROMIC 3-0 PS-2 27" 1638H

## (undated) DEVICE — LINEN TOWEL PACK X10 5473

## (undated) DEVICE — SU PDS II 4-0 RB-1 27" Z304H

## (undated) DEVICE — PREP SKIN SCRUB TRAY 4461A

## (undated) DEVICE — BAG CLEAR TRASH 1.3M 39X33" P4040C

## (undated) DEVICE — SOL NACL 0.9% IRRIG 1000ML BOTTLE 2F7124

## (undated) DEVICE — LINEN FULL SHEET 5511

## (undated) DEVICE — GLOVE PROTEXIS POWDER FREE 6.5 ORTHOPEDIC 2D73ET65

## (undated) DEVICE — PREP DURAPREP 26ML APL 8630

## (undated) DEVICE — PANTIES MESH LG/XLG 2PK 706M2

## (undated) DEVICE — PREP SCRUB SOL EXIDINE 4% CHG 4OZ 29002-404

## (undated) DEVICE — SU VICRYL 3-0 SH 27" UND J416H

## (undated) DEVICE — DRAPE LAP PEDS DISP 29492

## (undated) DEVICE — SUPPORTER ATHLETIC LG LATEX 202636

## (undated) DEVICE — BLADE KNIFE SURG 15 371115

## (undated) DEVICE — GLOVE PROTEXIS POWDER FREE 7.0 ORTHOPEDIC 2D73ET70

## (undated) DEVICE — DRSG KERLIX FLUFFS X5

## (undated) DEVICE — LINEN HALF SHEET 5512

## (undated) DEVICE — PACK MINOR CUSTOM RIDGES SBA32RMRMA

## (undated) DEVICE — GLOVE PROTEXIS POWDER FREE 7.5 ORTHOPEDIC 2D73ET75

## (undated) RX ORDER — CEFAZOLIN SODIUM 2 G/100ML
INJECTION, SOLUTION INTRAVENOUS
Status: DISPENSED
Start: 2020-10-11

## (undated) RX ORDER — HYDROMORPHONE HYDROCHLORIDE 1 MG/ML
INJECTION, SOLUTION INTRAMUSCULAR; INTRAVENOUS; SUBCUTANEOUS
Status: DISPENSED
Start: 2020-10-12

## (undated) RX ORDER — PROPOFOL 10 MG/ML
INJECTION, EMULSION INTRAVENOUS
Status: DISPENSED
Start: 2020-10-12

## (undated) RX ORDER — ACETAMINOPHEN 500 MG
TABLET ORAL
Status: DISPENSED
Start: 2020-10-12

## (undated) RX ORDER — FENTANYL CITRATE 50 UG/ML
INJECTION, SOLUTION INTRAMUSCULAR; INTRAVENOUS
Status: DISPENSED
Start: 2020-10-11

## (undated) RX ORDER — DEXAMETHASONE SODIUM PHOSPHATE 4 MG/ML
INJECTION, SOLUTION INTRA-ARTICULAR; INTRALESIONAL; INTRAMUSCULAR; INTRAVENOUS; SOFT TISSUE
Status: DISPENSED
Start: 2020-10-11

## (undated) RX ORDER — ONDANSETRON 2 MG/ML
INJECTION INTRAMUSCULAR; INTRAVENOUS
Status: DISPENSED
Start: 2020-10-12

## (undated) RX ORDER — GINSENG 100 MG
CAPSULE ORAL
Status: DISPENSED
Start: 2020-10-11

## (undated) RX ORDER — LIDOCAINE HYDROCHLORIDE 10 MG/ML
INJECTION, SOLUTION EPIDURAL; INFILTRATION; INTRACAUDAL; PERINEURAL
Status: DISPENSED
Start: 2020-10-11

## (undated) RX ORDER — ONDANSETRON 2 MG/ML
INJECTION INTRAMUSCULAR; INTRAVENOUS
Status: DISPENSED
Start: 2020-10-11

## (undated) RX ORDER — PROPOFOL 10 MG/ML
INJECTION, EMULSION INTRAVENOUS
Status: DISPENSED
Start: 2020-10-11